# Patient Record
Sex: FEMALE | Race: ASIAN | NOT HISPANIC OR LATINO
[De-identification: names, ages, dates, MRNs, and addresses within clinical notes are randomized per-mention and may not be internally consistent; named-entity substitution may affect disease eponyms.]

---
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
## 2019-02-20 ENCOUNTER — APPOINTMENT (OUTPATIENT)
Dept: INTERNAL MEDICINE | Facility: CLINIC | Age: 19
End: 2019-02-20
Payer: COMMERCIAL

## 2019-02-20 VITALS
SYSTOLIC BLOOD PRESSURE: 123 MMHG | DIASTOLIC BLOOD PRESSURE: 76 MMHG | TEMPERATURE: 98.2 F | WEIGHT: 150 LBS | OXYGEN SATURATION: 98 % | HEIGHT: 67 IN | HEART RATE: 85 BPM | BODY MASS INDEX: 23.54 KG/M2

## 2019-02-20 PROCEDURE — 90471 IMMUNIZATION ADMIN: CPT

## 2019-02-20 PROCEDURE — 90715 TDAP VACCINE 7 YRS/> IM: CPT

## 2019-02-20 PROCEDURE — 99214 OFFICE O/P EST MOD 30 MIN: CPT | Mod: 25

## 2019-02-20 RX ORDER — ADAPALENE 3 MG/G
0.3 GEL TOPICAL DAILY
Qty: 1 | Refills: 2 | Status: DISCONTINUED | COMMUNITY
Start: 2018-11-15 | End: 2019-02-20

## 2019-02-20 NOTE — PAST MEDICAL HISTORY
[Menstruating] : menstruating [Menarche Age ____] : age at menarche was [unfilled] [Definite ___ (Date)] : the last menstrual period was [unfilled] [Normal Amount/Duration] : it was of a normal amount and duration [Normal Duration] : the duration was normal [Regular Cycle Intervals] : have been regular

## 2019-02-20 NOTE — HEALTH RISK ASSESSMENT
[No falls in past year] : Patient reported no falls in the past year [0] : 2) Feeling down, depressed, or hopeless: Not at all (0) [] : No [de-identified] : none [de-identified] : Ophthalmology [[id number]] : 0

## 2019-02-20 NOTE — HISTORY OF PRESENT ILLNESS
[FreeTextEntry1] : follow up visit for vitamin d deficiency and Acne [de-identified] : Patient is a 18/F presenting for a follow up examination. She was given adapalene for acne when she was seen last time however was not dispensed by pharmacy due to insurance issues, so she never got it. She was prescribed 50,000 U of Vitamin D, she has completed 8 weeks total. Apart from this, her overall health and mood is excellent.\par She was found to have elevated protein in her CMP. she takes about 1 serving of meat a day and denies taking any protein shakes or other supplements. denies any family history of leukemia, myeloma or other blood disorders.\par She was referred to see Dermatologist, she has an appointment coming up in March.

## 2019-02-20 NOTE — COUNSELING
[Weight management counseling provided] : Weight management [Healthy eating counseling provided] : healthy eating [Activity counseling provided] : activity [Behavioral health counseling provided] : behavioral health  [Weight Self Once Weekly] : Weight self once weekly [Low Fat Diet] : Low fat diet [___ min/wk activity recommended] : [unfilled] min/wk activity recommended [Walking] : Walking [Sleep ___ hours/day] : Sleep [unfilled] hours/day [Engage in a relaxing activity] : Engage in a relaxing activity [None] : None

## 2019-02-20 NOTE — END OF VISIT
[] : Resident [>50% of Time Spent on Counseling for ____] : Greater than 50% of the encounter time was spent on counseling for [unfilled]

## 2019-02-20 NOTE — PHYSICAL EXAM
[No Acute Distress] : no acute distress [Well Nourished] : well nourished [Well Developed] : well developed [Well-Appearing] : well-appearing [PERRL] : pupils equal round and reactive to light [EOMI] : extraocular movements intact [Normal Outer Ear/Nose] : the outer ears and nose were normal in appearance [Normal Oropharynx] : the oropharynx was normal [Normal Appearance] : was normal in appearance [Neck Supple] : was supple [Rate ___] : at [unfilled] breaths per minute [Normal Rhythm/Effort] : normal respiratory rhythm and effort [Clear Bilaterally] : the lungs were clear to auscultation bilaterally [Normal to Percussion] : the lungs were normal to percussion [5th Left ICS - MCL] : palpated at the 5th LICS in the midclavicular line [Normal Rate] : normal [Heart Rate ___] : [unfilled] bpm [Normal S1] : normal S1 [Normal S2] : normal S2 [No Murmur] : no murmurs heard [No Pitting Edema] : no pitting edema present [2+] : left 2+ [No Abnormalities] : the abdominal aorta was not enlarged and no bruit was heard [Soft, Nontender] : the abdomen was soft and nontender [No Mass] : no masses were palpated [No HSM] : no hepatosplenomegaly noted [Normal Kyphosis] : normal kyphosis [No Visual Abnormalities] : no visible abnormalities [Normal Lordosis] : normal lordosis [No Scoliosis] : no scoliosis [No Tenderness to Palpation] : no spine tenderness on palpation [No Masses] : no masses [Full ROM] : full ROM [No Pain with ROM] : no pain with motion in any direction [Intact] : all reflexes within normal limits bilaterally [Normal Station and Gait] : the gait and station were normal [Normal Motor Tone] : the muscle tone was normal [Involuntary Movements] : no involuntary movements were seen [Normal Scalp] : inspection of the scalp showed no abnormalities [Examination Of The Hair] : texture and distribution of hair was normal [Complexion Medium] : medium complexion [Normal] : the deep tendon reflexes were normal [Normal Mental Status] : the patient's orientation, memory, attention, language and fund of knowledge were normal [Appropriate] : appropriate [Enlarged Diffusely] : was not enlarged [S3] : no S3 [S4] : no S4 [Right Carotid Bruit] : no bruit heard over the right carotid [Left Carotid Bruit] : no bruit heard over the left carotid [Right Femoral Bruit] : no bruit heard over the right femoral artery [Left Femoral Bruit] : no bruit heard over the left femoral artery [Cervical Lymph Nodes Enlarged Posterior Bilaterally] : nodes not enlarged [Supraclavicular Lymph Nodes Enlarged Bilaterally] : nodes not enlarged [Axillary Lymph Nodes Enlarged Bilaterally] : nodes not enlarged [Inguinal Lymph Nodes Enlarged Bilaterally] : nodes not enlarged [Abnormal Color] : normal color and pigmentation [Skin Lesions 1] : no skin lesions were observed [Skin Turgor Decreased] : normal skin turgor [Impaired judgment] : intact judgment [Impaired Insight] : intact insight [de-identified] : wearing contact lenses

## 2019-02-20 NOTE — ASSESSMENT
[FreeTextEntry1] : Tdap vaccination: MEDICATION NAME: Boostrix 5-2.5-18.5 SUSP\par \par GENERIC NAME:  Diphtheria and Tetanus Toxoids, and Acellular Pertussis Vaccine (dif THEER ee a & TET a nus TOKS nalinis, & ay SHAJI marga lar per TUS sis vak SEEN) \par \par COMMON USES:  It is used to prevent diphtheria, tetanus, and pertussis. \par \par HOW TO USE THIS MEDICINE:  HOW IS THIS DRUG BEST TAKEN? Use this drug as ordered by your doctor. Read all information given to you. Follow all instructions closely. It is given as a shot into a muscle. HOW DO I STORE AND/OR THROW OUT THIS DRUG? If you need to store this drug at home, talk with your doctor, nurse, or pharmacist about how to store it. WHAT DO I DO IF I MISS A DOSE? Call your doctor to find out what to do. \par \par CAUTIONS:  Tell all of your health care providers that you take this drug. This includes your doctors, nurses, pharmacists, and dentists. This drug may not protect all people who use it. Talk with the doctor. If you have a latex allergy, talk with your doctor. Not all brands of vaccines are for children. Talk with your child's doctor. Some children may need to have more than 1 dose of this vaccine. Talk with your child's doctor. Tell your doctor if you are pregnant or plan on getting pregnant. You will need to talk about the benefits and risks of using this drug while you are pregnant. Tell your doctor if you are breast-feeding. You will need to talk about any risks to your baby. \par \par POSSIBLE SIDE EFFECTS:  WHAT ARE SOME SIDE EFFECTS THAT I NEED TO CALL MY DOCTOR ABOUT RIGHT AWAY? WARNING/CAUTION: Even though it may be rare, some people may have very bad and sometimes deadly side effects when taking a drug. Tell your doctor or get medical help right away if you have any of the following signs or symptoms that may be related to a very bad side effect: Signs of an allergic reaction, like rash; hives; itching; red, swollen, blistered, or peeling skin with or without fever; wheezing; tightness in the chest or throat; trouble breathing, swallowing, or talking; unusual hoarseness; or swelling of the mouth, face, lips, tongue, or throat. Feeling confused. Very bad dizziness or passing out. Change in eyesight. Seizures. A burning, numbness, or tingling feeling that is not normal. Muscle weakness. Trouble controlling body movements. Very bad irritation where the shot was given. WHAT ARE SOME OTHER SIDE EFFECTS OF THIS DRUG? All drugs may cause side effects. However, many people have no side effects or only have minor side effects. Call your doctor or get medical help if any of these side effects or any other side effects bother you or do not go away: Pain where the shot was given. Redness or swelling where the shot is given. Headache. Feeling tired or weak. Fever or chills. Upset stomach or throwing up. Belly pain. Diarrhea. Joint pain or swelling. Swollen gland. Young children: Feeling fussy. Not hungry. Feeling sleepy. Crying that is not normal. These are not all of the side effects that may occur. If you have questions about side effects, call your doctor. Call your doctor for medical advice about side effects. You may report side effects to the FDA at 9-083-FDA-9305. You may also report side effects at http://www.fda.gov/medwatch. http://www.fda.gov/medwatch. \par \par BEFORE USING THIS MEDICINE:  WHAT DO I NEED TO TELL MY DOCTOR BEFORE I TAKE THIS DRUG? TELL YOUR DOCTOR: If you have an allergy to any part of this drug. TELL YOUR DOCTOR: If you are allergic to any drugs like this one, any other drugs, foods, or other substances. Tell your doctor about the allergy and what signs you had, like rash; hives; itching; shortness of breath; wheezing; cough; swelling of face, lips, tongue, or throat; or any other signs. TELL YOUR DOCTOR: If you have seizures or any other brain or nervous system problem. TELL YOUR DOCTOR: If you have had a brain problem like coma, lowered level of awareness, or seizures from an unknown cause within 7 days of a previous vaccine that has pertussis. This is not a list of all drugs or health problems that interact with this drug. Tell your doctor and pharmacist about all of your drugs (prescription or OTC, natural products, vitamins) and health problems. You must check to make sure that it is safe for you to take this drug with all of your drugs and health problems. Do not start, stop, or change the dose of any drug without checking with your doctor. \par \par OVERDOSE:  If you think there has been an overdose, call your poison control center or get medical care right away. Be ready to tell or show what was taken, how much, and when it happened. emergency room (ER) right away. \par \par ADDITIONAL INFORMATION:  If your symptoms or health problems do not get better or if they become worse, call your doctor. Do not share your drugs with others and do not take anyone else's drugs. Keep a list of all your drugs (prescription, natural products, vitamins, OTC) with you. Give this list to your doctor. Talk with the doctor before starting any new drug, including prescription or OTC, natural products, or vitamins. Keep all drugs in a safe place. Keep all drugs out of the reach of children and pets. Throw away unused or  drugs. Do not flush down a toilet or pour down a drain unless you are told to do so. Check with your pharmacist if you have questions about the best way to throw out drugs. There may be drug take-back programs in your area. Some drugs may have another patient information leaflet. Check with your pharmacist. If you have any questions about this drug, please talk with your doctor, nurse, pharmacist, or other health care provider. \par \par Copyright 2019 [employer]. All rights reserved.   \par \par Vitamin D deficiency: Completed 8 weeks of 50,000 U of vitamin d, will recheck levels today. Advised to take over the counter 1000U of vitamin D in the meanwhile and encourage sun exposure\par \par Elevated Serum protein: Will recheck CMP to see if she still has elevated protein. check SPEP and serum total protein. this could be related to dietary cause , malignancies but unlikely, infections , myelomas, but again unlikely , amyloid sarcoid among other etiological causes . I will repeat first and determine if it is a true elevation.  \par \par Acne: she will see Dermatologist to discuss further plan of care      \par \par Health maintenance: TDap vaccine given today as her previous one was in  \par \par

## 2019-02-22 LAB
25(OH)D3 SERPL-MCNC: 32.1 NG/ML
ALBUMIN SERPL ELPH-MCNC: 4.5 G/DL
ALP BLD-CCNC: 71 U/L
ALT SERPL-CCNC: 13 U/L
ANION GAP SERPL CALC-SCNC: 13 MMOL/L
AST SERPL-CCNC: 20 U/L
BILIRUB SERPL-MCNC: 0.7 MG/DL
BUN SERPL-MCNC: 11 MG/DL
CALCIUM SERPL-MCNC: 9.8 MG/DL
CHLORIDE SERPL-SCNC: 101 MMOL/L
CO2 SERPL-SCNC: 25 MMOL/L
CREAT SERPL-MCNC: 0.7 MG/DL
GLUCOSE SERPL-MCNC: 83 MG/DL
POTASSIUM SERPL-SCNC: 4.2 MMOL/L
PROT SERPL-MCNC: 8.5 G/DL
PROT SERPL-MCNC: 8.8 G/DL
SODIUM SERPL-SCNC: 139 MMOL/L

## 2019-03-07 ENCOUNTER — TRANSCRIPTION ENCOUNTER (OUTPATIENT)
Age: 19
End: 2019-03-07

## 2019-03-26 ENCOUNTER — APPOINTMENT (OUTPATIENT)
Dept: DERMATOLOGY | Facility: CLINIC | Age: 19
End: 2019-03-26
Payer: COMMERCIAL

## 2019-03-26 VITALS
WEIGHT: 146 LBS | HEART RATE: 73 BPM | SYSTOLIC BLOOD PRESSURE: 106 MMHG | TEMPERATURE: 99.3 F | DIASTOLIC BLOOD PRESSURE: 72 MMHG | BODY MASS INDEX: 22.91 KG/M2 | HEIGHT: 67 IN

## 2019-03-26 PROCEDURE — 99203 OFFICE O/P NEW LOW 30 MIN: CPT

## 2019-05-08 ENCOUNTER — APPOINTMENT (OUTPATIENT)
Dept: INTERNAL MEDICINE | Facility: CLINIC | Age: 19
End: 2019-05-08

## 2019-05-28 ENCOUNTER — APPOINTMENT (OUTPATIENT)
Dept: DERMATOLOGY | Facility: CLINIC | Age: 19
End: 2019-05-28

## 2019-06-12 ENCOUNTER — APPOINTMENT (OUTPATIENT)
Dept: INTERNAL MEDICINE | Facility: CLINIC | Age: 19
End: 2019-06-12
Payer: COMMERCIAL

## 2019-06-12 VITALS
SYSTOLIC BLOOD PRESSURE: 110 MMHG | WEIGHT: 143 LBS | BODY MASS INDEX: 22.44 KG/M2 | TEMPERATURE: 98.1 F | DIASTOLIC BLOOD PRESSURE: 60 MMHG | HEIGHT: 67 IN | OXYGEN SATURATION: 98 % | HEART RATE: 78 BPM

## 2019-06-12 PROCEDURE — 99214 OFFICE O/P EST MOD 30 MIN: CPT

## 2019-06-12 NOTE — PHYSICAL EXAM
[Well Nourished] : well nourished [No Acute Distress] : no acute distress [Well-Appearing] : well-appearing [Well Developed] : well developed [PERRL] : pupils equal round and reactive to light [Normal Sclera/Conjunctiva] : normal sclera/conjunctiva [Normal Oropharynx] : the oropharynx was normal [Normal Outer Ear/Nose] : the outer ears and nose were normal in appearance [EOMI] : extraocular movements intact [No JVD] : no jugular venous distention [Supple] : supple [No Lymphadenopathy] : no lymphadenopathy [No Respiratory Distress] : no respiratory distress  [No Accessory Muscle Use] : no accessory muscle use [Normal Rate] : normal rate  [Clear to Auscultation] : lungs were clear to auscultation bilaterally [Regular Rhythm] : with a regular rhythm [Normal S1, S2] : normal S1 and S2 [No Edema] : there was no peripheral edema [No Extremity Clubbing/Cyanosis] : no extremity clubbing/cyanosis [No Palpable Aorta] : no palpable aorta [Soft] : abdomen soft [Non-distended] : non-distended [Non Tender] : non-tender [No Masses] : no abdominal mass palpated [Normal Posterior Cervical Nodes] : no posterior cervical lymphadenopathy [No HSM] : no HSM [Normal Bowel Sounds] : normal bowel sounds [Normal Anterior Cervical Nodes] : no anterior cervical lymphadenopathy [No CVA Tenderness] : no CVA  tenderness [No Joint Swelling] : no joint swelling [Grossly Normal Strength/Tone] : grossly normal strength/tone [No Spinal Tenderness] : no spinal tenderness [Coordination Grossly Intact] : coordination grossly intact [Normal Gait] : normal gait [No Rash] : no rash [No Focal Deficits] : no focal deficits [Deep Tendon Reflexes (DTR)] : deep tendon reflexes were 2+ and symmetric [Normal Affect] : the affect was normal [Normal Insight/Judgement] : insight and judgment were intact

## 2019-06-12 NOTE — COUNSELING
[Healthy eating counseling provided] : healthy eating [Plan in advance] : Plan in advance [Engage in a relaxing activity] : Engage in a relaxing activity [Sleep ___ hours/day] : Sleep [unfilled] hours/day [Good understanding] : Patient has a good understanding of lifestyle changes and the steps needed to achieve self management goals

## 2019-06-12 NOTE — END OF VISIT
[FreeTextEntry3] : discussed with resident  plan exam recommendations.   [>50% of Time Spent on Counseling for ____] : Greater than 50% of the encounter time was spent on counseling for [unfilled]

## 2019-06-12 NOTE — HISTORY OF PRESENT ILLNESS
[FreeTextEntry1] : elevated protein. [de-identified] : PT has elevated protein of 8.8 and she is not drinking protein drinks and doesn’t follow keto diet or high meat or protein consumption  she has seen dermatologist and I appreciate her note.  she is feeling good and has no complaints.

## 2019-06-12 NOTE — HEALTH RISK ASSESSMENT
[0] : 2) Feeling down, depressed, or hopeless: Not at all (0) [de-identified] : twice a week.   [] : No [de-identified] : regular.

## 2019-06-12 NOTE — ASSESSMENT
[FreeTextEntry1] : elevated protein She will have repeated levels taken today and we discussed causes of elevated protein and will refer to hematologist if still elevated.   amyloid cancers, inflammation  diet.  She will have further testing today as well. She is not dehydrated and drinks 88 ounces a day and has normal urine color.  Multiple myeloma can also cause  elevated protein but she doesn’t fit the  profile of pts with this disorder.   She will keep diary of food intake and protein intake.

## 2019-06-14 LAB
ALBUMIN MFR SERPL ELPH: 53.4 %
ALBUMIN MFR SERPL ELPH: 54.6 %
ALBUMIN SERPL-MCNC: 4.3 G/DL
ALBUMIN SERPL-MCNC: 4.3 G/DL
ALBUMIN/GLOB SERPL: 1.2 RATIO
ALBUMIN/GLOB SERPL: 1.2 RATIO
ALPHA1 GLOB MFR SERPL ELPH: 3.5 %
ALPHA1 GLOB MFR SERPL ELPH: 3.6 %
ALPHA1 GLOB SERPL ELPH-MCNC: 0.3 G/DL
ALPHA1 GLOB SERPL ELPH-MCNC: 0.3 G/DL
ALPHA2 GLOB MFR SERPL ELPH: 8.3 %
ALPHA2 GLOB MFR SERPL ELPH: 8.6 %
ALPHA2 GLOB SERPL ELPH-MCNC: 0.7 G/DL
ALPHA2 GLOB SERPL ELPH-MCNC: 0.7 G/DL
ANION GAP SERPL CALC-SCNC: 12 MMOL/L
APO A-I SERPL-MCNC: 139 MG/DL
APO A-I/APO B SERPL: 0.6 RATIO
APO B SERPL-MCNC: 85 MG/DL
B-GLOBULIN MFR SERPL ELPH: 10 %
B-GLOBULIN MFR SERPL ELPH: 9.9 %
B-GLOBULIN SERPL ELPH-MCNC: 0.8 G/DL
B-GLOBULIN SERPL ELPH-MCNC: 0.8 G/DL
BASOPHILS # BLD AUTO: 0.04 K/UL
BASOPHILS NFR BLD AUTO: 0.5 %
BUN SERPL-MCNC: 10 MG/DL
CALCIUM SERPL-MCNC: 10.2 MG/DL
CHLORIDE SERPL-SCNC: 99 MMOL/L
CO2 SERPL-SCNC: 26 MMOL/L
CREAT SERPL-MCNC: 0.65 MG/DL
CRP SERPL-MCNC: 0.16 MG/DL
DEPRECATED KAPPA LC FREE/LAMBDA SER: 1.16 RATIO
EOSINOPHIL # BLD AUTO: 0.16 K/UL
EOSINOPHIL NFR BLD AUTO: 1.9 %
ERYTHROCYTE [SEDIMENTATION RATE] IN BLOOD BY WESTERGREN METHOD: 43 MM/HR
FERRITIN SERPL-MCNC: 25 NG/ML
GAMMA GLOB FLD ELPH-MCNC: 1.9 G/DL
GAMMA GLOB FLD ELPH-MCNC: 2 G/DL
GAMMA GLOB MFR SERPL ELPH: 23.5 %
GAMMA GLOB MFR SERPL ELPH: 24.6 %
GLUCOSE SERPL-MCNC: 83 MG/DL
HCT VFR BLD CALC: 36.5 %
HCV AB SER QL: NONREACTIVE
HCV S/CO RATIO: 0.13 S/CO
HEPATITIS A IGG ANTIBODY: REACTIVE
HGB BLD-MCNC: 11.3 G/DL
IGA SER QL IEP: 222 MG/DL
IGG SER QL IEP: 2031 MG/DL
IGM SER QL IEP: 218 MG/DL
IMM GRANULOCYTES NFR BLD AUTO: 0.4 %
INTERPRETATION SERPL IEP-IMP: NORMAL
INTERPRETATION SERPL IEP-IMP: NORMAL
IRON SATN MFR SERPL: 19 %
IRON SERPL-MCNC: 58 UG/DL
KAPPA LC CSF-MCNC: 1.58 MG/DL
KAPPA LC SERPL-MCNC: 1.83 MG/DL
LYMPHOCYTES # BLD AUTO: 2.26 K/UL
LYMPHOCYTES NFR BLD AUTO: 26.9 %
M PROTEIN SPEC IFE-MCNC: NORMAL
MAN DIFF?: NORMAL
MCHC RBC-ENTMCNC: 26 PG
MCHC RBC-ENTMCNC: 31 GM/DL
MCV RBC AUTO: 84.1 FL
MONOCYTES # BLD AUTO: 0.66 K/UL
MONOCYTES NFR BLD AUTO: 7.9 %
NEUTROPHILS # BLD AUTO: 5.25 K/UL
NEUTROPHILS NFR BLD AUTO: 62.4 %
PLATELET # BLD AUTO: 174 K/UL
POTASSIUM SERPL-SCNC: 4.1 MMOL/L
PROT SERPL-MCNC: 7.9 G/DL
PROT SERPL-MCNC: 7.9 G/DL
PROT SERPL-MCNC: 8 G/DL
PROT SERPL-MCNC: 8 G/DL
PROT UR-MCNC: 5 MG/DL
RBC # BLD: 4.34 M/UL
RBC # FLD: 14.1 %
SODIUM SERPL-SCNC: 137 MMOL/L
TIBC SERPL-MCNC: 304 UG/DL
UIBC SERPL-MCNC: 246 UG/DL
WBC # FLD AUTO: 8.4 K/UL

## 2019-07-02 ENCOUNTER — APPOINTMENT (OUTPATIENT)
Dept: DERMATOLOGY | Facility: CLINIC | Age: 19
End: 2019-07-02
Payer: COMMERCIAL

## 2019-07-02 VITALS
BODY MASS INDEX: 22.44 KG/M2 | TEMPERATURE: 98 F | HEIGHT: 67 IN | WEIGHT: 143 LBS | DIASTOLIC BLOOD PRESSURE: 72 MMHG | HEART RATE: 75 BPM | SYSTOLIC BLOOD PRESSURE: 115 MMHG

## 2019-07-02 PROCEDURE — 99213 OFFICE O/P EST LOW 20 MIN: CPT

## 2019-09-24 ENCOUNTER — APPOINTMENT (OUTPATIENT)
Dept: DERMATOLOGY | Facility: CLINIC | Age: 19
End: 2019-09-24
Payer: COMMERCIAL

## 2019-10-04 ENCOUNTER — APPOINTMENT (OUTPATIENT)
Dept: DERMATOLOGY | Facility: CLINIC | Age: 19
End: 2019-10-04
Payer: COMMERCIAL

## 2019-10-04 VITALS — HEIGHT: 68 IN | WEIGHT: 143 LBS | BODY MASS INDEX: 21.67 KG/M2

## 2019-10-04 PROCEDURE — 99213 OFFICE O/P EST LOW 20 MIN: CPT | Mod: GC

## 2019-10-23 ENCOUNTER — APPOINTMENT (OUTPATIENT)
Dept: INTERNAL MEDICINE | Facility: CLINIC | Age: 19
End: 2019-10-23

## 2020-08-27 ENCOUNTER — APPOINTMENT (OUTPATIENT)
Dept: INTERNAL MEDICINE | Facility: CLINIC | Age: 20
End: 2020-08-27
Payer: COMMERCIAL

## 2020-08-27 VITALS
BODY MASS INDEX: 21.37 KG/M2 | WEIGHT: 141 LBS | SYSTOLIC BLOOD PRESSURE: 133 MMHG | DIASTOLIC BLOOD PRESSURE: 74 MMHG | OXYGEN SATURATION: 98 % | HEART RATE: 111 BPM | HEIGHT: 68 IN | TEMPERATURE: 97.6 F

## 2020-08-27 PROCEDURE — 99395 PREV VISIT EST AGE 18-39: CPT

## 2020-08-27 NOTE — END OF VISIT
[>50% of the face to face encounter time was spent on counseling and/or coordination of care for ___] : Greater than 50% of the face to face encounter time was spent on counseling and/or coordination of care for [unfilled] [FreeTextEntry3] : resident was present for exam

## 2020-08-27 NOTE — ASSESSMENT
[FreeTextEntry1] : health she has normal bmi and is up to date with vaccines .  she needs flu vaccine \par bmi 20 increase exercise to 150-300mins \par 2 anemia  recheck her iron cbc \par 3. elevated blood protein  recheck her level \par 4 acne vulgaris- refer to  dermatologist for fu.  Although acne is not physically disabling, its psychologic impact can be striking, contributing to low self-esteem, depression, and anxiety [1-3]. As a result, there is a significant demand for effective acne therapies (table 1).\par \par The lack of standardization for grading acne severity and measuring treatment outcomes has made systematic interpretation of the literature difficult [4]. However, quality evidence-based literature in the field of acne is increasing [5-7].\par \par Medical therapies for acne and therapeutic principles that support the selection of treatments will be discussed here. A table summarizing treatment recommendations for patients with acne vulgaris is provided (table 2). Procedural therapies for acne, oral isotretinoin therapy for severe acne, hormonal therapy for women with acne, and interventions for acne scars are reviewed in detail separately. (See "Light-based, adjunctive, and other therapies for acne vulgaris" and "Oral isotretinoin therapy for acne vulgaris" and "Hormonal therapy for women with acne vulgaris" and "Management of acne scars".)\par \par \par PRETREATMENT ASSESSMENT\par Deciding on the appropriate course of treatment for acne requires a comprehensive assessment that includes:\par \par ?Clinical type and severity of acne (eg, comedonal, papulopustular, mixed, nodular)\par \par \par •Determines the types of treatments needed (see 'General approach' below)\par \par \par ?Skin type (eg, dry, oily)\par \par \par •Influences choice of topical drug vehicle (see 'General approach' below)\par \par \par ?Presence of acne scarring\par \par \par •Indicates need to consider more aggressive acne therapy and treatments for scarring (see 'Oral isotretinoin' below and "Management of acne scars")\par \par \par ?Presence of postinflammatory erythema or postinflammatory hyperpigmentation\par \par \par •Indicates need to consider therapies for hyperpigmentation or erythema as well as the need to resolve and prevent inflammatory acne lesions (see 'Therapy for postinflammatory hyperpigmentation' below)\par \par \par ?Menstrual cycle history and history of signs of hyperandrogenism in women\par \par \par •Identifies need to consider laboratory workup and hormonal therapies (see "Hormonal therapy for women with acne vulgaris" and "Pathogenesis, clinical manifestations, and diagnosis of acne vulgaris", section on 'Additional tests')\par \par \par ?Current skin care regimen and acne treatment history\par \par \par •Identifies successful and unsuccessful previous treatments\par \par \par •Identifies skin care practices that should be adjusted or discontinued during acne therapy (see 'Home skin care recommendations' below)\par \par \par ?History of acne-promoting cosmetic products and medications\par \par \par •Identifies potential for improvement with discontinuation of topical cosmetic products (acne cosmetica) and medications (acne medicamentosa) that may contribute to acne (table 3) \par \par \par ?Psychologic impact of acne on the patient\par \par \par •Identifies need for a more aggressive treatment approach or psychologic services\par \par \par \par TREATMENT PRINCIPLES\par Medical therapies for acne target one or more of four key factors that promote the development of acne lesions: follicular hyperproliferation and abnormal desquamation, increased sebum production, Cutibacterium (formerly Propionibacterium) acnes proliferation, and inflammation (figure 1). (See "Pathogenesis, clinical manifestations, and diagnosis of acne vulgaris", section on 'Pathogenesis'.)\par \par These factors are targeted as follows [8]:\par \par ?Follicular hyperproliferation and abnormal desquamation\par \par \par •Topical retinoids\par \par •Oral retinoids\par \par •Azelaic acid\par \par •Salicylic acid\par \par •Hormonal therapies\par \par \par ?Increased sebum production\par \par \par •Oral isotretinoin\par \par •Hormonal therapies\par \par \par ?C. acnes proliferation \par \par \par •Benzoyl peroxide\par \par •Topical and oral antibiotics\par \par •Azelaic acid\par \par \par ?Inflammation\par \par \par •Oral isotretinoin\par \par •Oral tetracyclines\par \par •Topical retinoids\par \par •Azelaic acid\par \par \par In addition, the procedural therapies used as adjunctive therapies for acne target one or more contributory factors. The mechanisms of these interventions are reviewed separately. (See "Light-based, adjunctive, and other therapies for acne vulgaris".)\par \par Knowledge of mechanisms of action of acne therapies is combined with recognition of specific clinical features to determine the best approach to treatment. The following concepts guide the selection of therapy:\par \par ?Topical retinoids are beneficial for both comedonal (noninflammatory) (picture 1A-B) and inflammatory acne (picture 2A) and should be included in the initial management of most patients. Topical retinoids are effective in the treatment of comedonal acne due to their ability to normalize follicular hyperkeratosis and prevent formation of the microcomedo, the primary lesion of acne [9]. The efficacy of topical retinoids for inflammatory acne [10,11] may be due to a combination of intrinsic anti-inflammatory properties of topical retinoids and their ability to prevent the formation of microcomedones. Topical retinoids can be used as monotherapy in individuals with exclusively comedonal acne.\par \par \par ?Patients with an inflammatory component often benefit from antimicrobial therapies (eg, benzoyl peroxide or topical antibiotics). Antimicrobial agents reduce the number of proinflammatory C. acnes colonizing the skin. (See 'Topical antimicrobials' below.)\par \par \par ?Patients with moderate to severe inflammatory acne often warrant more aggressive treatment with oral antibiotics [8]. Antibiotics in the tetracycline class are most frequently used, and appear to have both antibacterial and anti-inflammatory properties. (See 'Oral antibiotics' below.)\par \par \par ?The use of benzoyl peroxide with topical or oral antibiotics decreases the emergence of antibiotic resistant bacteria. Therefore, use of benzoyl peroxide is recommended in patients receiving antibiotic therapy [7]. (See 'Oral antibiotics' below.)\par \par \par ?Androgens stimulate increased sebum production, which contributes to the formation of acne [9]. Hormonal therapy may benefit women with moderate to severe acne, even in the absence of a hyperandrogenic state. (See "Hormonal therapy for women with acne vulgaris".)\par \par \par ?Patients should be given realistic expectations regarding timelines for improvement. Improvement in acne is dependent upon both the prevention and resolution of acne papules, pustules, and nodules. At least two to three months of consistent adherence to a therapeutic regimen is often necessary prior to concluding that treatment is ineffective. Adjustments to the regimen also may be needed. \par \par \par ?Acne typically recurs over years, and maintenance therapy is an important component of acne management. (See 'Maintenance therapy' below.)\par \par \par \par APPROACH TO TREATMENT\par \par General approach — An example of an initial approach to acne based upon the principles above is outlined below. Additional therapeutic options are reviewed in a table derived from the 2016 acne treatment guidelines from the American Academy [employer] Dermat[employer] (table 2) [7]. \par \par ?Comedonal (noninflammatory) acne (picture 1A-B)\par \par \par •Topical retinoid (alternatives include azelaic acid and salicylic acid) \par \par \par ?Mild papulopustular and mixed (comedonal and papulopustular) acne (picture 2A-B)\par \par \par •Topical antimicrobial (eg, benzoyl peroxide alone or benzoyl peroxide +/- topical antibiotic) AND\par \par •Topical retinoid \par \par \par OR\par \par \par •Benzoyl peroxide AND topical antibiotic (for patients who cannot tolerate a retinoid or who require a simplified treatment regimen)\par \par \par ?Moderate papulopustular and mixed acne (picture 3)\par \par \par •Topical retinoid AND\par \par •Oral antibiotic AND\par \par •Topical benzoyl peroxide \par \par \par ?Severe acne (eg, nodular acne) (picture 4A-C)\par \par \par •Topical retinoid AND\par \par •Oral antibiotic AND\par \par •Topical benzoyl peroxide\par \par \par OR\par \par \par •Oral isotretinoin monotherapy (see 'Oral isotretinoin' below and "Oral isotretinoin therapy for acne vulgaris")\par \par \par Of note, oral hormonal therapy is an alternative to oral antibiotic therapy in postmenarchal females with moderate to severe acne (table 2). (See "Hormonal therapy for women with acne vulgaris".)\par \par Consistent adherence to acne therapy is critical for achieving clinical improvement. When recommending topical therapy, the clinician should design a regimen that is feasible for the patient. This should involve a discussion with the patient to ensure that each component of the treatment regimen is acceptable to the patient. We typically design regimens that require the patient to treat no more than once or twice daily. \par \par A sample regimen for a patient with mild inflammatory facial acne who is using a topical retinoid, topical benzoyl peroxide, and topical clindamycin is as follows:\par \par ?Morning: Wash face with a gentle facial cleanser. Apply a thin layer of a fixed-dose combination benzoyl peroxide/clindamycin gel to the entire face. (An alternative regimen could require the patient to wash the face with a benzoyl peroxide cleanser followed by application of a thin layer of topical clindamycin to the entire face.)\par \par \par ?Night: Wash face with a gentle facial cleanser. Apply a thin layer of the topical retinoid to the entire face.\par \par \par Some patients may prefer the use of fixed-dose combination products to simplify treatment (table 4). However, the cost of such treatments may be higher than single-active ingredient agents. (See 'Combination therapy' below.)\par \par The selection of the delivery system for topical acne medications also may help to improve the likelihood of adherence to treatment. The choice depends upon the patient's skin type (dry versus oily) and preference. Some gels have a drying effect; they may be preferred by patients with oily skin. Creams and lotions tend to be moisturizing. Solutions are drying but they cover large areas more easily than other preparations, and foams are easy to apply to hair-bearing areas. Pledgets are single-use absorbent pads impregnated with medication. They are convenient to use and facilitate the spreading of medication over large areas. \par \par Clinicians should be aware that further modifications to the vehicle of a drug may also affect characteristics such as efficacy, tolerability, or stability. As an example, the microsphere formulation of tretinoin consists of tretinoin contained in microscopic biodegradable spherical particles. As the microspheres are degraded, tretinoin is slowly and progressively delivered to the skin. This formulation is associated with improved drug stability and decreased irritation [12,13]. (See 'Topical retinoids' below.)\par \par Procedural therapies are sometimes used in conjunction with conventional medical therapy. These interventions are reviewed separately. (See "Light-based, adjunctive, and other therapies for acne vulgaris", section on 'Light/laser therapies' and "Light-based, adjunctive, and other therapies for acne vulgaris", section on 'Adjunctive therapies'.)\par \par Truncal acne — Although the treatment of both facial and truncal acne can be approached similarly, a challenge for the treatment of truncal acne is the difficulty associated with applying topical treatments to the entire affected area. Given the difficulty that patients may have applying medications to areas such as the back compared with the face and the large quantity of topical medication required for use on large areas, we have a lower threshold for incorporating oral antibiotic or oral hormonal therapies into the treatment regimen. Pharmacy-provided medication applicators designed to aid with application of medication or emollients to the back are sometimes helpful for topical treatment\par

## 2020-08-27 NOTE — COUNSELING
[Sleep ___ hours/day] : Sleep [unfilled] hours/day [Plan in advance] : Plan in advance [Engage in a relaxing activity] : Engage in a relaxing activity [____ min/wk Activity] : [unfilled] min/wk activity

## 2020-08-27 NOTE — HISTORY OF PRESENT ILLNESS
[FreeTextEntry1] : cpe  [de-identified] : Pt is a 20 yr old woman who has hx of anemia dn acne vulgaris who came for her cpe.

## 2020-08-27 NOTE — HEALTH RISK ASSESSMENT
[Very Good] : ~his/her~ current health as very good [Excellent] : ~his/her~  mood as  excellent [No] : No [No falls in past year] : Patient reported no falls in the past year [0] : 2) Feeling down, depressed, or hopeless: Not at all (0) [Hepatitis C test offered] : Hepatitis C test offered [None] : None [# of Members in Household ___] :  household currently consist of [unfilled] member(s) [With Family] : lives with family [Student] : student [College] : College [Single] : single [Smoke Detector] : smoke detector [Carbon Monoxide Detector] : carbon monoxide detector [Safety elements used in home] : safety elements used in home [Sunscreen] : uses sunscreen [Seat Belt] :  uses seat belt [FreeTextEntry1] : facial sunburn [] : No [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  [de-identified] : walks  [de-identified] : healthy  [Change in mental status noted] : No change in mental status noted [[id number]] : 0 [Language] : denies difficulty with language [Behavior] : denies difficulty with behavior [Learning/Retaining New Information] : denies difficulty learning/retaining new information [Handling Complex Tasks] : denies difficulty handling complex tasks [Reasoning] : denies difficulty with reasoning [Spatial Ability and Orientation] : denies difficulty with spatial ability and orientation [Sexually Active] : not sexually active [Reports changes in hearing] : Reports no changes in hearing [Reports changes in dental health] : Reports no changes in dental health [Reports changes in vision] : Reports no changes in vision [Guns at Home] : no guns at home [Travel to Developing Areas] : does not  travel to developing areas [TB Exposure] : is not being exposed to tuberculosis [Caregiver Concerns] : does not have caregiver concerns [PapSmearDate] : never  [HepatitisCDate] : 6/19 [de-identified] : last eye  exam 2/20 [AdvancecareDate] : 08/20

## 2020-08-27 NOTE — PHYSICAL EXAM
[Normal] : no joint swelling, grossly normal strength/tone [de-identified] : no lymphadenopathy bilateral  [de-identified] : examined sitting and supine  normal exam  [de-identified] : Fundoscopic normal  [de-identified] : good skin turgor  no eruptions   [de-identified] : alert and oriented normal affect normal exam

## 2020-08-28 LAB
25(OH)D3 SERPL-MCNC: 22.7 NG/ML
ALBUMIN SERPL ELPH-MCNC: 4.8 G/DL
ALP BLD-CCNC: 60 U/L
ALT SERPL-CCNC: 9 U/L
ANION GAP SERPL CALC-SCNC: 13 MMOL/L
AST SERPL-CCNC: 16 U/L
BASOPHILS # BLD AUTO: 0.07 K/UL
BASOPHILS NFR BLD AUTO: 0.7 %
BILIRUB SERPL-MCNC: 0.6 MG/DL
BUN SERPL-MCNC: 8 MG/DL
CALCIUM SERPL-MCNC: 9.6 MG/DL
CHLORIDE SERPL-SCNC: 103 MMOL/L
CHOLEST SERPL-MCNC: 187 MG/DL
CHOLEST/HDLC SERPL: 3.7 RATIO
CO2 SERPL-SCNC: 23 MMOL/L
CREAT SERPL-MCNC: 0.63 MG/DL
EOSINOPHIL # BLD AUTO: 0.18 K/UL
EOSINOPHIL NFR BLD AUTO: 1.7 %
FERRITIN SERPL-MCNC: 40 NG/ML
GLUCOSE SERPL-MCNC: 78 MG/DL
HCT VFR BLD CALC: 39.3 %
HDLC SERPL-MCNC: 50 MG/DL
HGB BLD-MCNC: 11.7 G/DL
IMM GRANULOCYTES NFR BLD AUTO: 0.3 %
IRON SATN MFR SERPL: 19 %
IRON SERPL-MCNC: 59 UG/DL
LDLC SERPL CALC-MCNC: 123 MG/DL
LYMPHOCYTES # BLD AUTO: 2.06 K/UL
LYMPHOCYTES NFR BLD AUTO: 19.8 %
MAN DIFF?: NORMAL
MCHC RBC-ENTMCNC: 26.9 PG
MCHC RBC-ENTMCNC: 29.8 GM/DL
MCV RBC AUTO: 90.3 FL
MONOCYTES # BLD AUTO: 0.69 K/UL
MONOCYTES NFR BLD AUTO: 6.6 %
NEUTROPHILS # BLD AUTO: 7.35 K/UL
NEUTROPHILS NFR BLD AUTO: 70.9 %
PLATELET # BLD AUTO: 193 K/UL
POTASSIUM SERPL-SCNC: 4.1 MMOL/L
PROT SERPL-MCNC: 8.3 G/DL
RBC # BLD: 4.35 M/UL
RBC # FLD: 13.3 %
SODIUM SERPL-SCNC: 139 MMOL/L
TIBC SERPL-MCNC: 309 UG/DL
TRIGL SERPL-MCNC: 71 MG/DL
UIBC SERPL-MCNC: 250 UG/DL
WBC # FLD AUTO: 10.38 K/UL

## 2020-08-31 LAB
M TB IFN-G BLD-IMP: NEGATIVE
QUANTIFERON TB PLUS MITOGEN MINUS NIL: 7.2 IU/ML
QUANTIFERON TB PLUS NIL: 0.01 IU/ML
QUANTIFERON TB PLUS TB1 MINUS NIL: 0 IU/ML
QUANTIFERON TB PLUS TB2 MINUS NIL: 0 IU/ML

## 2020-10-26 ENCOUNTER — APPOINTMENT (OUTPATIENT)
Dept: DERMATOLOGY | Facility: CLINIC | Age: 20
End: 2020-10-26
Payer: COMMERCIAL

## 2020-10-26 PROCEDURE — 99213 OFFICE O/P EST LOW 20 MIN: CPT

## 2020-10-26 PROCEDURE — 99072 ADDL SUPL MATRL&STAF TM PHE: CPT

## 2020-11-02 ENCOUNTER — NON-APPOINTMENT (OUTPATIENT)
Age: 20
End: 2020-11-02

## 2020-11-02 ENCOUNTER — TRANSCRIPTION ENCOUNTER (OUTPATIENT)
Age: 20
End: 2020-11-02

## 2021-01-30 ENCOUNTER — NON-APPOINTMENT (OUTPATIENT)
Age: 21
End: 2021-01-30

## 2021-02-22 ENCOUNTER — APPOINTMENT (OUTPATIENT)
Dept: DERMATOLOGY | Facility: CLINIC | Age: 21
End: 2021-02-22
Payer: COMMERCIAL

## 2021-02-22 PROCEDURE — 99072 ADDL SUPL MATRL&STAF TM PHE: CPT

## 2021-02-22 PROCEDURE — 99214 OFFICE O/P EST MOD 30 MIN: CPT

## 2021-02-22 RX ORDER — TRETINOIN 0.25 MG/G
0.03 CREAM TOPICAL
Qty: 1 | Refills: 1 | Status: DISCONTINUED | COMMUNITY
Start: 2019-03-26 | End: 2021-02-22

## 2021-02-22 RX ORDER — DOXYCYCLINE 100 MG/1
100 TABLET, FILM COATED ORAL
Qty: 120 | Refills: 0 | Status: DISCONTINUED | COMMUNITY
Start: 2020-10-26 | End: 2021-02-22

## 2021-03-01 ENCOUNTER — TRANSCRIPTION ENCOUNTER (OUTPATIENT)
Age: 21
End: 2021-03-01

## 2021-06-16 ENCOUNTER — APPOINTMENT (OUTPATIENT)
Dept: DERMATOLOGY | Facility: CLINIC | Age: 21
End: 2021-06-16
Payer: COMMERCIAL

## 2021-06-16 VITALS — WEIGHT: 140 LBS | BODY MASS INDEX: 21.22 KG/M2 | HEIGHT: 68 IN

## 2021-06-16 PROCEDURE — 99214 OFFICE O/P EST MOD 30 MIN: CPT

## 2021-06-16 RX ORDER — SPIRONOLACTONE 50 MG/1
50 TABLET ORAL
Qty: 1 | Refills: 2 | Status: DISCONTINUED | COMMUNITY
Start: 2021-02-22 | End: 2021-06-16

## 2021-09-20 ENCOUNTER — APPOINTMENT (OUTPATIENT)
Dept: DERMATOLOGY | Facility: CLINIC | Age: 21
End: 2021-09-20
Payer: COMMERCIAL

## 2021-09-20 VITALS — WEIGHT: 140 LBS | BODY MASS INDEX: 21.22 KG/M2 | HEIGHT: 68 IN

## 2021-09-20 PROCEDURE — 99213 OFFICE O/P EST LOW 20 MIN: CPT

## 2021-10-25 ENCOUNTER — APPOINTMENT (OUTPATIENT)
Dept: INTERNAL MEDICINE | Facility: CLINIC | Age: 21
End: 2021-10-25
Payer: COMMERCIAL

## 2021-10-25 VITALS
DIASTOLIC BLOOD PRESSURE: 77 MMHG | WEIGHT: 135 LBS | TEMPERATURE: 97.7 F | HEART RATE: 106 BPM | HEIGHT: 68 IN | OXYGEN SATURATION: 98 % | SYSTOLIC BLOOD PRESSURE: 122 MMHG | BODY MASS INDEX: 20.46 KG/M2

## 2021-10-25 DIAGNOSIS — Z23 ENCOUNTER FOR IMMUNIZATION: ICD-10-CM

## 2021-10-25 DIAGNOSIS — L24.89 IRRITANT CONTACT DERMATITIS DUE TO OTHER AGENTS: ICD-10-CM

## 2021-10-25 PROCEDURE — G0008: CPT

## 2021-10-25 PROCEDURE — 99395 PREV VISIT EST AGE 18-39: CPT | Mod: 25

## 2021-10-25 PROCEDURE — 90686 IIV4 VACC NO PRSV 0.5 ML IM: CPT

## 2021-10-25 RX ORDER — CHOLECALCIFEROL (VITAMIN D3) 1250 MCG
1.25 MG CAPSULE ORAL
Qty: 4 | Refills: 2 | Status: COMPLETED | COMMUNITY
Start: 2018-11-19 | End: 2021-10-25

## 2021-10-25 RX ORDER — TAZAROTENE 0.05 MG/G
0.05 CREAM CUTANEOUS
Qty: 1 | Refills: 3 | Status: COMPLETED | COMMUNITY
Start: 2021-09-20 | End: 2021-10-25

## 2021-10-25 RX ORDER — TRETINOIN 0.5 MG/G
0.05 CREAM TOPICAL
Qty: 1 | Refills: 11 | Status: COMPLETED | COMMUNITY
Start: 2019-10-04 | End: 2021-10-25

## 2021-10-25 NOTE — HEALTH RISK ASSESSMENT
[Excellent] : ~his/her~  mood as  excellent [No] : In the past 12 months have you used drugs other than those required for medical reasons? No [No falls in past year] : Patient reported no falls in the past year [0] : 2) Feeling down, depressed, or hopeless: Not at all (0) [PHQ-2 Negative - No further assessment needed] : PHQ-2 Negative - No further assessment needed [HIV test declined] : HIV test declined [Hepatitis C test offered] : Hepatitis C test offered [FreeTextEntry1] : none  [] : No [de-identified] : dermatology  [de-identified] : 2 times a week.  walking [de-identified] : reg [[id number]] : 0 [PapSmearDate] : never  [PapSmearComments] : never had sexual relations.

## 2021-10-25 NOTE — COUNSELING
[Sleep ___ hours/day] : Sleep [unfilled] hours/day [Engage in a relaxing activity] : Engage in a relaxing activity [Plan in advance] : Plan in advance [FreeTextEntry2] : bmi low   eat healthy     ideal weight 143 lbs  she is 135

## 2021-10-25 NOTE — PLAN
[FreeTextEntry1] : Influenza Virus Vaccine (Inactivated) (in floo EN za VYE sebastien vak SEEN, in ak ti RADHA deb)   COMMON USES:  It is used to prevent the flu.   HOW TO USE THIS MEDICINE:  HOW IS THIS DRUG BEST TAKEN? Use this drug as ordered by your doctor. Read all information given to you. Follow all instructions closely. It is given as a shot into a muscle. HOW DO I STORE AND/OR THROW OUT THIS DRUG? If you need to store this drug at home, talk with your doctor, nurse, or pharmacist about how to store it. WHAT DO I DO IF I MISS A DOSE? Call your doctor to find out what to do.   CAUTIONS:  Tell all of your health care providers that you take this drug. This includes your doctors, nurses, pharmacists, and dentists. If you have a latex allergy, talk with your doctor. If you are allergic to eggs, talk with the doctor. Like all vaccines, this vaccine may not fully protect all people who get it. If you have questions, talk with the doctor. This drug is a vaccine with a virus that is not active. It cannot cause the disease. This drug is not a cure for the flu. It must be given before you are exposed to the flu in order to work. Most of the time, it takes a few weeks for this drug to work. This drug only protects you for 1 flu season. You will need to get the flu vaccine each year. If you have a weak immune system or take drugs that weaken the immune system, talk with your doctor. This vaccine may not work as well. Not all brands of vaccines are for all children. Talk with your child's doctor. Some children may need to have more than 1 dose of this vaccine. Talk with your child's doctor. Some children have had a fever and seizures caused by fevers with some flu vaccines. Most of the time, this happened in children younger than 5 years of age. Fever has also been seen in children 5 to younger than 9 years of age. Talk with your child's doctor. Tell your doctor if you are pregnant, plan on getting pregnant, or are breast-feeding. You will need to talk about the benefits and risks to you and the baby.   POSSIBLE SIDE EFFECTS:  WHAT ARE SOME SIDE EFFECTS THAT I NEED TO CALL MY DOCTOR ABOUT RIGHT AWAY? WARNING/CAUTION: Even though it may be rare, some people may have very bad and sometimes deadly side effects when taking a drug. Tell your doctor or get medical help right away if you have any of the following signs or symptoms that may be related to a very bad side effect: Signs of an allergic reaction, like rash; hives; itching; red, swollen, blistered, or peeling skin with or without fever; wheezing; tightness in the chest or throat; trouble breathing, swallowing, or talking; unusual hoarseness; or swelling of the mouth, face, lips, tongue, or throat. A burning, numbness, or tingling feeling that is not normal. Not able to move face muscles as much. Trouble controlling body movements. Very bad dizziness or passing out. Muscle weakness. Seizures. Change in eyesight. WHAT ARE SOME OTHER SIDE EFFECTS OF THIS DRUG? All drugs may cause side effects. However, many people have no side effects or only have minor side effects. Call your doctor or get medical help if any of these side effects or any other side effects bother you or do not go away: For all patients taking this drug: Pain, redness, swelling, or other reaction where the injection was given. Headache. Muscle or joint pain. Feeling tired or weak. Chills. Young children: Mild fever. Upset stomach or throwing up. Stomach pain or diarrhea. Not hungry. Feeling sleepy. Feeling fussy. Crying that is not normal. These are not all of the side effects that may occur. If you have questions about side effects, call your doctor. Call your doctor for medical advice about side effects. Report side effects to the FDA/CDC Vaccine Adverse Event Reporting System (VAERS) at https://vaers.hhs.gov/reportevent.html or by calling 1-190.191.5219.   BEFORE USING THIS MEDICINE:  WHAT DO I NEED TO TELL MY DOCTOR BEFORE I TAKE THIS DRUG? TELL YOUR DOCTOR: If you are allergic to this drug; any part of this drug; or any other drugs, foods, or substances. Tell your doctor about the allergy and what signs you had. This drug may interact with other drugs or health problems. Tell your doctor and pharmacist about all of your drugs (prescription or OTC, natural products, vitamins) and health problems. You must check to make sure that it is safe for you to take this drug with all of your drugs and health problems. Do not start, stop, or change the dose of any drug without checking with your doctor.   OVERDOSE:  If you think there has been an overdose, call your poison control center or get medical care right away. Be ready to tell or show what was taken, how much, and when it happened.   ADDITIONAL INFORMATION:  If your symptoms or health problems do not get better or if they become worse, call your doctor. Do not share your drugs with others and do not take anyone else's drugs. Keep all drugs in a safe place. Keep all drugs out of the reach of children and pets. Throw away unused or  drugs. Do not flush down a toilet or pour down a drain unless you are told to do so. Check with your pharmacist if you have questions about the best way to throw out drugs. There may be drug take-back programs in your area. Some drugs may have another patient information leaflet. Check with your pharmacist. If you have any questions about this drug, please talk with your doctor, nurse, pharmacist, or other health care provider.   Copyright  [employer] All Rights Reserved.

## 2021-10-25 NOTE — HISTORY OF PRESENT ILLNESS
[FreeTextEntry1] : cpe  [de-identified] : :Pt is a 21 yr old woman with hx of acne vulgaris and being seen by dermatologist  .  She is here today for her annual exam.  Seh -denies any headaches, nausea, vomiting, fever, chills, sweats, chest pain, chest pressure, diarrhea, constipation, dysphagia, sour taste in the mouth, dizziness, leg swelling, leg pain, myalgias, arthralgias, itchy eyes, itchy ears, heartburn, or reflux. \par \par \par

## 2021-10-25 NOTE — ASSESSMENT
[FreeTextEntry1] : health -  she needs eye and dental exam and flu vaccine  bmi 20 and normal healthy eating  exercise at least 150-300 mins perweek.  \par 2 anemia check her cbc and iron and ferritin levels \par 3.  flu vaccine needed I discussed risks side effects and she would like vaccine no prior reaction noted.  \par 4.  acne  continue dermatology [facility] Medical therapies for acne target one or more of four key factors that promote the development of acne lesions: follicular hyperproliferation and abnormal desquamation, increased sebum production, Cutibacterium (formerly Propionibacterium) acnes proliferation, and inflammation (figure 1). (See "Pathogenesis, clinical manifestations, and diagnosis of acne vulgaris", section on 'Pathogenesis'.)\par \par These factors are targeted as follows [8]:\par \par ?Follicular hyperproliferation and abnormal desquamation\par \par \par •Topical retinoids\par \par •Oral retinoids\par \par •Azelaic acid\par \par •Salicylic acid\par \par •Hormonal therapies\par \par \par ?Increased sebum production\par \par \par •Oral isotretinoin\par \par •Hormonal therapies\par \par \par ?C. acnes proliferation \par \par \par •Benzoyl peroxide\par \par •Topical and oral antibiotics\par \par •Azelaic acid\par \par \par ?Inflammation\par \par \par •Oral isotretinoin\par \par •Oral tetracyclines\par \par •Topical retinoids\par \par •Azelaic acid\par \par \par In addition, the procedural therapies used as adjunctive therapies for acne target one or more contributory factors. The mechanisms of these interventions are reviewed separately. (See "Light-based, adjunctive, and other therapies for acne vulgaris".)\par \par Knowledge of mechanisms of action of acne therapies is combined with recognition of specific clinical features to determine the best approach to treatment. The following concepts guide the selection of therapy:\par \par ?Topical retinoids are beneficial for both comedonal (noninflammatory) (picture 1A-B) and inflammatory acne (picture 2A) and should be included in the initial management of most patients. Topical retinoids are effective in the treatment of comedonal acne due to their ability to normalize follicular hyperkeratosis and prevent formation of the microcomedo, the primary lesion of acne [9]. The efficacy of topical retinoids for inflammatory acne [10,11] may be due to a combination of intrinsic anti-inflammatory properties of topical retinoids and their ability to prevent the formation of microcomedones. Topical retinoids can be used as monotherapy in individuals with exclusively comedonal acne.\par \par \par ?Patients with an inflammatory component often benefit from antimicrobial therapies (eg, benzoyl peroxide or topical antibiotics). Antimicrobial agents reduce the number of proinflammatory C. acnes colonizing the skin. (See 'Topical antimicrobials' below.)\par \par \par ?Patients with moderate to severe inflammatory acne often warrant more aggressive treatment with oral antibiotics [8]. Antibiotics in the tetracycline class are most frequently used, and appear to have both antibacterial and anti-inflammatory properties. (See 'Oral antibiotics' below.)\par \par \par ?The use of benzoyl peroxide with topical or oral antibiotics decreases the emergence of antibiotic resistant bacteria. Therefore, use of benzoyl peroxide is recommended in patients receiving antibiotic therapy [7]. (See 'Oral antibiotics' below.)\par \par \par ?Androgens stimulate increased sebum production, which contributes to the formation of acne [9]. Hormonal therapy may benefit women with moderate to severe acne, even in the absence of a hyperandrogenic state. (See "Hormonal therapy for women with acne vulgaris".)\par \par \par ?Patients should be given realistic expectations regarding timelines for improvement. Improvement in acne is dependent upon both the prevention and resolution of acne papules, pustules, and nodules. At least two to three months of consistent adherence to a therapeutic regimen is often necessary prior to concluding that treatment is ineffective. Adjustments to the regimen also may be needed. \par \par \par ?Acne typically recurs over years, and maintenance therapy is an important component of acne management. (See 'Maintenance therapy' below.)\par \par \par \par APPROACH TO TREATMENT\par \par General approach — An example of an initial approach to acne based upon the principles above is outlined below. Additional therapeutic options are reviewed in a table derived from the 2016 acne treatment guidelines from the [employer] (table 2) [7]. \par \par ?Comedonal (noninflammatory) acne (picture 1A-B)\par \par \par •Topical retinoid (alternatives include azelaic acid and salicylic acid) \par \par \par ?Mild papulopustular and mixed (comedonal and papulopustular) acne (picture 2A-B)\par \par \par •Topical antimicrobial (eg, benzoyl peroxide alone or benzoyl peroxide +/- topical antibiotic) AND\par \par •Topical retinoid \par \par \par OR\par \par \par •Benzoyl peroxide AND topical antibiotic (for patients who cannot tolerate a retinoid or who require a simplified treatment regimen)\par \par \par ?Moderate papulopustular and mixed acne (picture 3)\par \par \par •Topical retinoid AND\par \par •Oral antibiotic AND\par \par •Topical benzoyl peroxide \par \par \par ?Severe acne (eg, nodular acne) (picture 4A-C)\par \par \par •Topical retinoid AND\par \par •Oral antibiotic AND\par \par •Topical benzoyl peroxide\par \par \par OR\par \par \par •Oral isotretinoin monotherapy (see 'Oral isotretinoin' below and "Oral isotretinoin therapy for acne vulgaris")\par \par \par Of note, oral hormonal therapy is an alternative to oral antibiotic therapy in postmenarchal females with moderate to severe acne (table 2). (See "Hormonal therapy for women with acne vulgaris".)\par \par Consistent adherence to acne therapy is critical for achieving clinical improvement. When recommending topical therapy, the clinician should design a regimen that is feasible for the patient. This should involve a discussion with the patient to ensure that each component of the treatment regimen is acceptable to the patient. We typically design regimens that require the patient to treat no more than once or twice daily. \par \par A sample regimen for a patient with mild inflammatory facial acne who is using a topical retinoid, topical benzoyl peroxide, and topical clindamycin is as follows:\par \par ?Morning: Wash face with a gentle facial cleanser. Apply a thin layer of a fixed-dose combination benzoyl peroxide/clindamycin gel to the entire face. (An alternative regimen could require the patient to wash the face with a benzoyl peroxide cleanser followed by application of a thin layer of topical clindamycin to the entire face.)\par \par \par ?Night: Wash face with a gentle facial cleanser. Apply a thin layer of the topical retinoid to the entire face.\par \par \par Some patients may prefer the use of fixed-dose combination products to simplify treatment (table 4). However, the cost of such treatments may be higher than single-active ingredient agents. (See 'Combination therapy' below.)\par \par The selection of the delivery system for topical acne medications also may help to improve the likelihood of adherence to treatment. The choice depends upon the patient's skin type (dry versus oily) and preference. Some gels have a drying effect; they may be preferred by patients with oily skin. Creams and lotions tend to be moisturizing. Solutions are drying but they cover large areas more easily than other preparations, and foams are easy to apply to hair-bearing areas. Pledgets are single-use absorbent pads impregnated with medication. They are convenient to use and facilitate the spreading of medication over large areas. \par \par Clinicians should be aware that further modifications to the vehicle of a drug may also affect characteristics such as efficacy, tolerability, or stability. As an example, the microsphere formulation of tretinoin consists of tretinoin contained in microscopic biodegradable spherical particles. As the microspheres are degraded, tretinoin is slowly and progressively delivered to the skin. This formulation is associated with improved drug stability and decreased irritation [12,13]. (See 'Topical retinoids' below.)\par \par Procedural therapies are sometimes used in conjunction with conventional medical therapy. These interventions are reviewed separately. (See "Light-based, adjunctive, and other therapies for acne vulgaris", section on 'Light/laser therapies' and "Light-based, adjunctive, and other therapies for acne vulgaris", section on 'Adjunctive therapies'.)\par \par Truncal acne — Although the treatment of both facial and truncal acne can be approached similarly, a challenge for the treatment of truncal acne is the difficulty associated with applying topical treatments to the entire affected area. Given the difficulty that patients may h\par 5 contact dermatitis  area on right forearm  apply steroidal cream to area.

## 2021-10-25 NOTE — PHYSICAL EXAM
[Well Developed] : well developed [Well Nourished] : well nourished [Conjunctiva] : the conjunctiva were normal in both eyes [PERRL] : pupils were equal in size, round, and reactive to light [EOM Intact] : extraocular movements were intact [Normal Appearance] : was normal in appearance [Neck Supple] : was supple [Rate ___] : at [unfilled] breaths per minute [Normal Rhythm/Effort] : normal respiratory rhythm and effort [Clear Bilaterally] : the lungs were clear to auscultation bilaterally [Normal to Percussion] : the lungs were normal to percussion [Heart Rate ___] : [unfilled] bpm [Rhythm Regular] : regular [Normal Rate] : normal [Normal S1] : normal S1 [Normal S2] : normal S2 [No Murmur] : no murmurs heard [No Pitting Edema] : no pitting edema present [2+] : left 2+ [No Abnormalities] : the abdominal aorta was not enlarged and no bruit was heard [Examination Of The Breasts] : a normal appearance [No Discharge] : no discharge [Soft, Nontender] : the abdomen was soft and nontender [No Mass] : no masses were palpated [No HSM] : no hepatosplenomegaly noted [No Lymphangitis] : no lymphangitis observed [Normal Kyphosis] : normal kyphosis [No Visual Abnormalities] : no visible abnormalities [Normal Lordosis] : normal lordosis [No Scoliosis] : no scoliosis [No Tenderness to Palpation] : no spine tenderness on palpation [No Masses] : no masses [Full ROM] : full ROM [No Pain with ROM] : no pain with motion in any direction [Intact] : all reflexes within normal limits bilaterally [Normal Station and Gait] : the gait and station were normal [Normal Motor Tone] : the muscle tone was normal [Involuntary Movements] : no involuntary movements were seen [Normal Scalp] : inspection of the scalp showed no abnormalities [Examination Of The Hair] : texture and distribution of hair was normal [Complexion Medium] : medium complexion [Skin Lesions 1] : Skin lesion: [Acne] : acneiform eruption [Lesions On Face] : on the face [Chest] : on the chest [Back] : on the back [Normal] : the deep tendon reflexes were normal [Normal Mental Status] : the patient's orientation, memory, attention, language and fund of knowledge were normal [Appropriate] : appropriate [Enlarged Diffusely] : was not enlarged [Rt] : no varicose veins of the right leg [Lt] : no varicose veins of the left leg [Bruit] : no bruit heard [S3] : no S3 [S4] : no S4 [Right Carotid Bruit] : no bruit heard over the right carotid [Left Carotid Bruit] : no bruit heard over the left carotid [Right Femoral Bruit] : no bruit heard over the right femoral artery [Left Femoral Bruit] : no bruit heard over the left femoral artery [Postauricular Lymph Nodes Enlarged Bilaterally] : nodes not enlarged [Preauricular Lymph Nodes Enlarged Bilaterally] : nodes not enlarged [Submandibular Lymph Nodes Enlarged Bilaterally] : nodes not enlarged [Suboccipital Lymph Nodes Enlarged Bilaterally] : nodes not enlarged [Submental Lymph Nodes Enlarged] : nodes not enlarged [Cervical Lymph Nodes Enlarged Posterior Bilaterally] : nodes not enlarged [Cervical Lymph Nodes Enlarged Anterior Bilaterally] : nodes not enlarged [Supraclavicular Lymph Nodes Enlarged Bilaterally] : nodes not enlarged [Axillary Lymph Nodes Enlarged Bilaterally] : nodes not enlarged [Epitrochlear Lymph Nodes Enlarged Bilaterally] : nodes not enlarged [Femoral Lymph Nodes Enlarged Bilaterally] : nodes not enlarged [Inguinal Lymph Nodes Enlarged Bilaterally] : nodes not enlarged [Abnormal Color] : normal color and pigmentation [Tattoo - Single] : no tattoos observed [Skin Turgor Decreased] : normal skin turgor [Impaired judgment] : intact judgment [Impaired Insight] : intact insight [de-identified] : front tooth  discolored  tongue normal   [FreeTextEntry1] : examined sitting and supine

## 2021-10-26 LAB
25(OH)D3 SERPL-MCNC: 20.1 NG/ML
ALBUMIN SERPL ELPH-MCNC: 4.8 G/DL
ALP BLD-CCNC: 61 U/L
ALT SERPL-CCNC: 8 U/L
ANION GAP SERPL CALC-SCNC: 12 MMOL/L
AST SERPL-CCNC: 15 U/L
BASOPHILS # BLD AUTO: 0.05 K/UL
BASOPHILS NFR BLD AUTO: 0.7 %
BILIRUB SERPL-MCNC: 0.6 MG/DL
BUN SERPL-MCNC: 10 MG/DL
CALCIUM SERPL-MCNC: 9.6 MG/DL
CHLORIDE SERPL-SCNC: 101 MMOL/L
CHOLEST SERPL-MCNC: 164 MG/DL
CO2 SERPL-SCNC: 24 MMOL/L
CREAT SERPL-MCNC: 0.72 MG/DL
EOSINOPHIL # BLD AUTO: 0.23 K/UL
EOSINOPHIL NFR BLD AUTO: 3.1 %
FERRITIN SERPL-MCNC: 22 NG/ML
GLUCOSE SERPL-MCNC: 76 MG/DL
HCT VFR BLD CALC: 36.8 %
HDLC SERPL-MCNC: 52 MG/DL
HGB BLD-MCNC: 11.2 G/DL
IMM GRANULOCYTES NFR BLD AUTO: 0.1 %
IRON SATN MFR SERPL: 19 %
IRON SERPL-MCNC: 63 UG/DL
LDLC SERPL CALC-MCNC: 98 MG/DL
LYMPHOCYTES # BLD AUTO: 1.71 K/UL
LYMPHOCYTES NFR BLD AUTO: 23.2 %
MAN DIFF?: NORMAL
MCHC RBC-ENTMCNC: 26.7 PG
MCHC RBC-ENTMCNC: 30.4 GM/DL
MCV RBC AUTO: 87.6 FL
MONOCYTES # BLD AUTO: 0.5 K/UL
MONOCYTES NFR BLD AUTO: 6.8 %
NEUTROPHILS # BLD AUTO: 4.88 K/UL
NEUTROPHILS NFR BLD AUTO: 66.1 %
NONHDLC SERPL-MCNC: 113 MG/DL
PLATELET # BLD AUTO: 188 K/UL
POTASSIUM SERPL-SCNC: 4.1 MMOL/L
PROT SERPL-MCNC: 8.1 G/DL
RBC # BLD: 4.2 M/UL
RBC # FLD: 13.6 %
SODIUM SERPL-SCNC: 137 MMOL/L
TIBC SERPL-MCNC: 331 UG/DL
TRIGL SERPL-MCNC: 75 MG/DL
UIBC SERPL-MCNC: 267 UG/DL
WBC # FLD AUTO: 7.38 K/UL

## 2021-10-27 LAB
APPEARANCE: CLEAR
BILIRUBIN URINE: NEGATIVE
BLOOD URINE: NEGATIVE
COLOR: NORMAL
GLUCOSE QUALITATIVE U: NEGATIVE
KETONES URINE: NEGATIVE
LEUKOCYTE ESTERASE URINE: NEGATIVE
NITRITE URINE: NEGATIVE
PH URINE: 6
PROTEIN URINE: NEGATIVE
SPECIFIC GRAVITY URINE: 1.01
UROBILINOGEN URINE: NORMAL

## 2021-11-05 ENCOUNTER — TRANSCRIPTION ENCOUNTER (OUTPATIENT)
Age: 21
End: 2021-11-05

## 2021-11-05 ENCOUNTER — NON-APPOINTMENT (OUTPATIENT)
Age: 21
End: 2021-11-05

## 2022-03-02 ENCOUNTER — APPOINTMENT (OUTPATIENT)
Dept: DERMATOLOGY | Facility: CLINIC | Age: 22
End: 2022-03-02
Payer: COMMERCIAL

## 2022-03-02 VITALS — WEIGHT: 140 LBS | BODY MASS INDEX: 21.22 KG/M2 | HEIGHT: 68 IN

## 2022-03-02 PROCEDURE — 99213 OFFICE O/P EST LOW 20 MIN: CPT

## 2022-03-02 RX ORDER — SPIRONOLACTONE 100 MG/1
100 TABLET ORAL
Qty: 3 | Refills: 1 | Status: DISCONTINUED | COMMUNITY
Start: 2021-06-16 | End: 2022-03-02

## 2022-07-06 ENCOUNTER — APPOINTMENT (OUTPATIENT)
Dept: DERMATOLOGY | Facility: CLINIC | Age: 22
End: 2022-07-06

## 2022-07-06 VITALS — WEIGHT: 133 LBS | HEIGHT: 67.5 IN | BODY MASS INDEX: 20.63 KG/M2

## 2022-07-06 PROCEDURE — 99214 OFFICE O/P EST MOD 30 MIN: CPT

## 2022-08-16 ENCOUNTER — APPOINTMENT (OUTPATIENT)
Dept: INTERNAL MEDICINE | Facility: CLINIC | Age: 22
End: 2022-08-16

## 2022-08-16 VITALS
BODY MASS INDEX: 20.32 KG/M2 | TEMPERATURE: 97 F | DIASTOLIC BLOOD PRESSURE: 67 MMHG | SYSTOLIC BLOOD PRESSURE: 105 MMHG | HEART RATE: 88 BPM | OXYGEN SATURATION: 99 % | WEIGHT: 131 LBS | HEIGHT: 67.5 IN

## 2022-08-16 DIAGNOSIS — R77.9 ABNORMALITY OF PLASMA PROTEIN, UNSPECIFIED: ICD-10-CM

## 2022-08-16 PROCEDURE — 99213 OFFICE O/P EST LOW 20 MIN: CPT

## 2022-08-16 NOTE — HEALTH RISK ASSESSMENT
[Never] : Never [No] : In the past 12 months have you used drugs other than those required for medical reasons? No [No falls in past year] : Patient reported no falls in the past year [0] : 2) Feeling down, depressed, or hopeless: Not at all (0) [PHQ-2 Negative - No further assessment needed] : PHQ-2 Negative - No further assessment needed [de-identified] : dermatology  Sr Roberts  [de-identified] : none walking  [de-identified] : healthier  [[id number]] : 0

## 2022-08-16 NOTE — HISTORY OF PRESENT ILLNESS
[FreeTextEntry1] : weight loss  [de-identified] : Pt has lost  weight  and was 140 in march and is now 131  and has lost 9lbs without trying.   she has changed her diet  and snacking less and avoiding  cookies and chocolate and ice cream due to how it affects her skin.She has not been exercising  .  she has moved her bedroom upstairs  in attic and keep her further from refrigerator.  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    She is not having diarrhea, insomnia,  nervousness or  intolerance to cold  or heat.  she doesn’t have muscle aches  , abdominal pain , gerd or feeling depressed.

## 2022-08-16 NOTE — END OF VISIT
[FreeTextEntry2] : resident  present  [Time Spent: ___ minutes] : I have spent [unfilled] minutes of time on the encounter. [>50% of the face to face encounter time was spent on counseling and/or coordination of care for ___] : Greater than 50% of the face to face encounter time was spent on counseling and/or coordination of care for [unfilled]

## 2022-08-16 NOTE — PHYSICAL EXAM
[No Acute Distress] : no acute distress [Well-Appearing] : well-appearing [Well Developed] : well developed [Well Nourished] : well nourished [Normal Voice Quality] : was normal [Normal Sclera/Conjunctiva] : normal sclera/conjunctiva [EOMI] : extraocular movements intact [20/___] : both eyes 20/[unfilled] [Conjunctiva] : the conjunctiva were normal in both eyes [PERRL] : pupils were equal in size, round, and reactive to light [EOM Intact] : extraocular movements were intact [Normal Outer Ear/Nose] : the outer ears and nose were normal in appearance [Normal Oropharynx] : the oropharynx was normal [Normal TMs] : both tympanic membranes were normal [Normal Nasal Mucosa] : the nasal mucosa was normal [No JVD] : no jugular venous distention [No Lymphadenopathy] : no lymphadenopathy [Supple] : supple [Thyroid Normal, No Nodules] : the thyroid was normal and there were no nodules present [Normal Appearance] : was normal in appearance [Neck Supple] : was supple [Enlarged Diffusely] : was not enlarged [JVP Elevated ___cm] : the JVP was not elevated [No Respiratory Distress] : no respiratory distress  [No Accessory Muscle Use] : no accessory muscle use [Clear to Auscultation] : lungs were clear to auscultation bilaterally [Normal Rhythm/Effort] : normal respiratory rhythm and effort [Clear Bilaterally] : the lungs were clear to auscultation bilaterally [Rales / Crackles Bilateral] : no rales or crackles were heard [Wheezing Bilaterally] : no wheezing was heard [Rhonchi Bilateral] : no rhonchi were heard [Decreased Breath Sounds] : breath sounds were not diminished [Pleural Friction Rub Bilaterally] : no friction rub heard [Bronchial Breath Sounds Bilaterally] : no bronchial breath sounds were heard [Normal to Percussion] : the lungs were normal to percussion [Regular Rhythm] : with a regular rhythm [Normal S1, S2] : normal S1 and S2 [No Carotid Bruits] : no carotid bruits [No Abdominal Bruit] : a ~M bruit was not heard ~T in the abdomen [No Varicosities] : no varicosities [Pedal Pulses Present] : the pedal pulses are present [No Edema] : there was no peripheral edema [No Palpable Aorta] : no palpable aorta [No Extremity Clubbing/Cyanosis] : no extremity clubbing/cyanosis [5th Left ICS - MCL] : palpated at the 5th LICS in the midclavicular line [Normal Rate] : normal [Heart Rate ___] : [unfilled] bpm [Rhythm Regular] : regular [Normal S1] : normal S1 [Normal S2] : normal S2 [S3] : no S3 [S4] : no S4 [No Murmur] : no murmurs heard [No Pitting Edema] : no pitting edema present [Rt] : no varicose veins of the right leg [Lt] : no varicose veins of the left leg [Right Carotid Bruit] : no bruit heard over the right carotid [Left Carotid Bruit] : no bruit heard over the left carotid [Right Femoral Bruit] : no bruit heard over the right femoral artery [Left Femoral Bruit] : no bruit heard over the left femoral artery [2+] : left 2+ [No Abnormalities] : the abdominal aorta was not enlarged and no bruit was heard [Bruit] : no bruit heard [Soft] : abdomen soft [Non Tender] : non-tender [Non-distended] : non-distended [Normal Bowel Sounds] : normal bowel sounds [Flat] : flat [Soft, Nontender] : the abdomen was soft and nontender [Raya's] : a negative Raya's sign [Rovsing's] : a negative Rovsing's sign [Obturator] : a negative obturator sign [Psoas] : a negative psoas sign [No Mass] : no masses were palpated [No HSM] : no hepatosplenomegaly noted [None] : no CVA tenderness [Normal Posterior Cervical Nodes] : no posterior cervical lymphadenopathy [Normal Anterior Cervical Nodes] : no anterior cervical lymphadenopathy [Cervical Lymph Nodes Enlarged Posterior Bilaterally] : nodes not enlarged [Supraclavicular Lymph Nodes Enlarged Bilaterally] : nodes not enlarged [Axillary Lymph Nodes Enlarged Bilaterally] : nodes not enlarged [Inguinal Lymph Nodes Enlarged Bilaterally] : nodes not enlarged [No CVA Tenderness] : no CVA  tenderness [No Spinal Tenderness] : no spinal tenderness [Normal Kyphosis] : normal kyphosis [No Visual Abnormalities] : no visible abnormalities [Normal Lordosis] : normal lordosis [No Scoliosis] : no scoliosis [No Tenderness to Palpation] : no spine tenderness on palpation [No Masses] : no masses [Full ROM] : full ROM [No Pain with ROM] : no pain with motion in any direction [Intact] : all reflexes within normal limits bilaterally [No Joint Swelling] : no joint swelling [Grossly Normal Strength/Tone] : grossly normal strength/tone [Normal Station and Gait] : the gait and station were normal [Normal Motor Tone] : the muscle tone was normal [Involuntary Movements] : no involuntary movements were seen [No Rash] : no rash [Abnormal Color] : normal color and pigmentation [Skin Lesions 1] : no skin lesions were observed [Skin Turgor Decreased] : normal skin turgor [Coordination Grossly Intact] : coordination grossly intact [No Focal Deficits] : no focal deficits [Normal Gait] : normal gait [Deep Tendon Reflexes (DTR)] : deep tendon reflexes were 2+ and symmetric [Normal] : the deep tendon reflexes were normal [Normal Affect] : the affect was normal [Normal Insight/Judgement] : insight and judgment were intact [Normal Mental Status] : the patient's orientation, memory, attention, language and fund of knowledge were normal [Appropriate] : appropriate [Impaired judgment] : intact judgment [Impaired Insight] : intact insight [de-identified] : teeth  missing left  front tooth    and bite has shifted to fill in   darkened  right  front tooth.

## 2022-08-16 NOTE — ASSESSMENT
[FreeTextEntry1] : 1  weight loss will check for thyroid disorder     but likely due to her sanchez ein diet and alos  the moving of her room away from refrigerator and not wanting to go down stairs \par 2 anemia check cbc iron ferritin b12  \par 3.  vit d   We discussed vit D and risks and understands the importance of taking the vitamin.  . Vitamin D is a nutrient found in some foods that is needed for health and to maintain strong bones. It does so by helping the body absorb calcium (one of bone’s main building blocks) from food and supplements. People who get too little vitamin D may develop soft, thin, and brittle bones, a condition known as rickets in children and osteomalacia in adults.\par \par Vitamin D is important to the body in many other ways as well. Muscles need it to move, for example, nerves need it to carry messages between the brain and every body part, and the immune system needs vitamin D to fight off invading bacteria and viruses. Together with calcium, vitamin D also helps protect older adults from osteoporosis. Vitamin D is found in cells throughout the body.\par \par How much vitamin D do I need?\par \par The amount of vitamin D you need each day depends on your age. Average daily recommended amounts from the Food and Nutrition Board (a national group of experts) for different ages are listed below in International Units (IU):\par \par \par Life Stage\par \par Recommended Amount\par \par \par Birth to 12 months 400 IU \par Children 1-13 years 600 IU \par Teens 14-18 years 600 IU \par Adults 19-70 years 600 IU \par Adults 71 years and older 800 IU \par Pregnant and breastfeeding women 600 IU \par   \par What foods provide vitamin D?\par \par Very few foods naturally have vitamin D. Fortified foods provide most of the vitamin D in American diets.\par •Fatty fish such as salmon, tuna, and mackerel are among the best sources.\par •Beef liver, cheese, and egg yolks provide small amounts.\par •Mushrooms provide some vitamin D. In some mushrooms that are newly available in stores, the vitamin D content is being boosted by exposing these mushrooms to ultraviolet light.\par •Almost all of the [place] milk supply is fortified with 400 IU of vitamin D per quart. But foods made from milk, like cheese and ice cream, are usually not fortified.\par •Vitamin D is added to many breakfast cereals and to some brands of orange juice, yogurt, margarine, and soy beverages; check the labels.\par \par Can I get vitamin D from the sun?\par \par The body makes vitamin D when skin is directly exposed to the sun, and most people meet at least some of their vitamin D needs this way. Skin exposed to sunshine indoors through a window will not produce vitamin D. Cloudy days, shade, and having dark-colored skin also cut down on the amount of vitamin D the skin makes.\par \par However, despite the importance of the sun to vitamin D synthesis, it is prudent to limit exposure of skin to sunlight in order to lower the risk for skin cancer. When out in the sun for more than a few minutes, wear protective clothing and apply sunscreen with an SPF (sun protection factor) of 8 or more. Tanning beds also cause the skin to make vitamin D, but pose similar risks for skin cancer.\par \par People who avoid the sun or who cover their bodies with sunscreen or clothing should include good sources of vitamin D in their diets or take a supplement. Recommended intakes of vitamin D are set on the assumption of little sun exposure.\par \par What kinds of vitamin D dietary supplements are available?\par \par Vitamin D is found in supplements (and fortified foods) in two different forms: D2 (ergocalciferol) and D3 (cholecalciferol). Both increase vitamin D in the blood.\par \par Am I getting enough vitamin D?\par \par Because vitamin D can come from sun, food, and supplements, the best measure of one’s vitamin D status is blood levels of a form known as 25-hydroxyvitamin D. Levels are described in either nanomoles per liter (nmol/L) or nanograms per milliliter (ng/mL), where 1 nmol/L = 0.4 ng/mL.\par \par In general, levels below 30 nmol/L (12 ng/mL) are too low for bone or overall health, and levels above 125 nmol/L (50 ng/mL) are probably too high. Levels of 50 nmol/L or above (20 ng/mL or above) are sufficient for most people.\par \par By these measures, some Americans are vitamin D deficient and almost no one has levels that are too high. In general, young people have higher blood levels of 25-hydroxyvitamin D than older people and males have higher levels than females. By race, non- blacks tend to have the lowest levels and non- whites the highest. The majority of Americans have blood levels lower than 75 nmol/L (30 ng/mL).\par \par Certain other groups may not get enough vitamin D:\par • infants, because human milk is a poor source of the nutrient.  infants should be given a supplement of 400 IU of vitamin D each day.\par •Older adults, because their skin doesn’t make vitamin D when exposed to sunlight as efficiently as when they were young, and their kidneys are less able to convert vitamin D to its active form.\par •People with dark skin, because their skin has less ability to produce vitamin D from the sun.\par •People with disorders such as Crohn’s disease or celiac disease who don’t handle fat properly, because vitamin D needs fat to be absorbed.\par •Obese people, because their body fat binds to some vitamin D and prevents it from getting into the blood.\par \par What happens if I don’t get enough vitamin D?\par \par People can become deficient in vitamin D because they don’t consume enough or absorb enough from food, their exposure to sunlight is limited, or their kidneys cannot convert vitamin D to its active form in the body. In children, vitamin D deficiency causes rickets, a condition in which the bones become soft and bend. It’s a rare disease but still occurs, especially among  infants and children. In adults, vitamin D deficiency leads to osteomalacia, causing bone pain and muscle weakness.\par \par What are some effects of vitamin D on health?\par \par Vitamin D is being studied for its possible connections to several diseases and medical problems, including diabetes, hypertension, and autoimmune conditions such as multiple sclerosis. Two of them discussed below are bone disorders and some types of cancer.\par \par Bone disorders\par \par As they get older, millions of people (mostly women, but men too) develop, or are at risk of, osteoporosis, condition in which bones become fragile and may fracture if one falls. It is one consequence of not getting enough calcium and vitamin D over the long term. Supplements of both vitamin D3 (at 700-800 IU/day) and calcium (500-1,200 mg/day) have been shown to reduce the risk of bone loss and fractures in elderly people aged 62-85 years. Men and women should talk with their healthcare providers about their needs for vitamin D (and calcium) as part of an overall plan to prevent or treat osteoporosis.\par \par Cancer\par \par Some studies suggest that vitamin D may protect against colon cancer and perhaps even cancers of the prostate and breast. But higher levels of vitamin D in the blood have also been linked to higher rates of pancreatic cancer. At this time, it’s too early to say whether low vitamin D status increases cancer risk and whether higher levels protect or even increase risk in some people.\par \par Can vitamin D be harmful?\par \par Yes, when amounts in the blood become too high. Signs of toxicity include nausea, vomiting, poor appetite, constipation, weakness, and weight loss. And by raising blood levels of calcium, too much vitamin D can cause confusion, disorientation, and problems with heart rhythm. Excess vitamin D can also damage the kidneys.\par \par The upper limit for vitamin D is 1,000 to 1,500 IU/day for infants, 2,500 to 3,000 IU/day for children 1-8 years, and 4,000 IU/day for children 9 years and older, adults, and pregnant and lactating teens and women. Vitamin D toxicity almost always occurs from overuse of supplements. Excessive sun exposure doesn’t cause vitamin D poisoning because the body limits the amount of this vitamin it produces.\par \par Are there any interactions with vitamin D that I should know about?\par \par Like most dietary supplements, vitamin D may interact or interfere with other medicines or supplements you might be taking. Here are several examples:\par •Prednisone and other corticosteroid medicines to reduce inflammation impair how the body handles vitamin D, which leads to lower calcium absorption an\par 4.  acne  Medical therapies for acne target one or more of four key factors that promote the development of acne lesions: follicular hyperproliferation and abnormal desquamation, increased sebum production, Cutibacterium (formerly Propionibacterium) acnes proliferation, and inflammation (figure 1). (See "Pathogenesis, clinical manifestations, and diagnosis of acne vulgaris", section on 'Pathogenesis'.)\par \par These factors are targeted as follows [8]:\par \par ?Follicular hyperproliferation and abnormal desquamation\par \par \par •Topical retinoids\par \par •Oral retinoids\par \par •Azelaic acid\par \par •Salicylic acid\par \par •Hormonal therapies\par \par \par ?Increased sebum production\par \par \par •Oral isotretinoin\par \par •Hormonal therapies\par \par \par ?C. acnes proliferation \par \par \par •Benzoyl peroxide\par \par •Topical and oral antibiotics\par \par •Azelaic acid\par \par \par ?Inflammation\par \par \par •Oral isotretinoin\par \par •Oral tetracyclines\par \par •Topical retinoids\par \par •Azelaic acid\par \par \par In addition, the procedural therapies used as adjunctive therapies for acne target one or more contributory factors. The mechanisms of these interventions are reviewed separately. (See "Light-based, adjunctive, and other therapies for acne vulgaris".)\par \par Knowledge of mechanisms of action of acne therapies is combined with recognition of specific clinical features to determine the best approach to treatment. The following concepts guide the selection of therapy:\par \par ?Topical retinoids are beneficial for both comedonal (noninflammatory) (picture 1A-B) and inflammatory acne (picture 2A) and should be included in the initial management of most patients. Topical retinoids are effective in the treatment of comedonal acne due to their ability to normalize follicular hyperkeratosis and prevent formation of the microcomedo, the primary lesion of acne [9]. The efficacy of topical retinoids for inflammatory acne [10,11] may be due to a combination of intrinsic anti-inflammatory properties of topical retinoids and their ability to prevent the formation of microcomedones. Topical retinoids can be used as monotherapy in individuals with exclusively comedonal acne.\par \par \par ?Patients with an inflammatory component often benefit from antimicrobial therapies (eg, benzoyl peroxide or topical antibiotics). Antimicrobial agents reduce the number of proinflammatory C. acnes colonizing the skin. (See 'Topical antimicrobials' below.)\par \par \par ?Patients with moderate to severe inflammatory acne often warrant more aggressive treatment with oral antibiotics [8]. Antibiotics in the tetracycline class are most frequently used, and appear to have both antibacterial and anti-inflammatory properties. (See 'Oral antibiotics' below.)\par \par \par ?The use of benzoyl peroxide with topical or oral antibiotics decreases the emergence of antibiotic resistant bacteria. Therefore, use of benzoyl peroxide is recommended in patients receiving antibiotic therapy [7]. (See 'Oral antibiotics' below.)\par \par \par ?Androgens stimulate increased sebum production, which contributes to the formation of acne [9]. Hormonal therapy may benefit women with moderate to severe acne, even in the absence of a hyperandrogenic state. (See "Hormonal therapy for women with acne vulgaris".)\par \par \par ?Patients should be given realistic expectations regarding timelines for improvement. Improvement in acne is dependent upon both the prevention and resolution of acne papules, pustules, and nodules. At least two to three months of consistent adherence to a therapeutic regimen is often necessary prior to concluding that treatment is ineffective. Adjustments to the regimen also may be needed. \par \par \par ?Acne typically recurs over years, and maintenance therapy is an important component of acne management. (See 'Maintenance therapy' below.)\par \par \par \par APPROACH TO TREATMENT\par \par General approach — An example of an initial approach to acne based upon the principles above is outlined below. Additional therapeutic options are reviewed in a table derived from the 2016 acne treatment guidelines from the American Academy of Dermatology (table 2) [7]. \par \par ?Comedonal (noninflammatory) acne (picture 1A-B)\par \par \par •Topical retinoid (alternatives include azelaic acid and salicylic acid) \par \par \par ?Mild papulopustular and mixed (comedonal and papulopustular) acne (picture 2A-B)\par \par \par •Topical antimicrobial (eg, benzoyl peroxide alone or benzoyl peroxide +/- topical antibiotic) AND\par \par •Topical retinoid \par \par \par OR\par \par \par •Benzoyl peroxide AND topical antibiotic (for patients who cannot tolerate a retinoid or who require a simplified treatment regimen)\par \par \par ?Moderate papulopustular and mixed acne (picture 3)\par \par \par •Topical retinoid AND\par \par •Oral antibiotic AND\par \par •Topical benzoyl peroxide \par \par \par ?Severe acne (eg, nodular acne) (picture 4A-C)\par \par \par •Topical retinoid AND\par \par •Oral antibiotic AND\par \par •Topical benzoyl peroxide\par \par \par OR\par \par \par •Oral isotretinoin monotherapy (see 'Oral isotretinoin' below and "Oral isotretinoin therapy for acne vulgaris")\par \par \par Of note, oral hormonal therapy is an alternative to oral antibiotic therapy in postmenarchal females with moderate to severe acne (table 2). (See "Hormonal therapy for women with acne vulgaris".)\par \par Consistent adherence to acne therapy is critical for achieving clinical improvement. When recommending topical therapy, the clinician should design a regimen that is feasible for the patient. This should involve a discussion with the patient to ensure that each component of the treatment regimen is acceptable to the patient. We typically design regimens that require the patient to treat no more than once or twice daily. \par \par A sample regimen for a patient with mild inflammatory facial acne who is using a topical retinoid, topical benzoyl peroxide, and topical clindamycin is as follows:\par \par ?Morning: Wash face with a gentle facial cleanser. Apply a thin layer of a fixed-dose combination benzoyl peroxide/clindamycin gel to the entire face. (An alternative regimen could require the patient to wash the face with a benzoyl peroxide cleanser followed by application of a thin layer of topical clindamycin to the entire face.)\par \par \par ?Night: Wash face with a gentle facial cleanser. Apply a thin layer of the topical retinoid to the entire face.\par \par \par Some patients may prefer the use of fixed-dose combination products to simplify treatment (table 4). However, the cost of such treatments may be higher than single-active ingredient agents. (See 'Combination therapy' below.)\par \par The selection of the delivery system for topical acne medications also may help to improve the likelihood of adherence to treatment. The choice depends upon the patient's skin type (dry versus oily) and preference. Some gels have a drying effect; they may be preferred by patients with oily skin. Creams and lotions tend to be moisturizing. Solutions are drying but they cover large areas more easily than other preparations, and foams are easy to apply to hair-bearing areas. Pledgets are single-use absorbent pads impregnated with medication. They are convenient to use and facilitate the spreading of medication over large areas. \par \par Clinicians should be aware that further modifications to the vehicle of a drug may also affect characteristics such as efficacy, tolerability, or stability. As an example, the microsphere formulation of tretinoin consists of tretinoin contained in microscopic biodegradable spherical particles. As the microspheres are degraded, tretinoin is slowly and progressively delivered to the skin. This formulation is associated with improved drug stability and decreased irritation [12,13]. (See 'Topical retinoids' below.)\par \par Procedural therapies are sometimes used in conjunction with conventional medical therapy. These interventions are reviewed separately. (See "Light-based, adjunctive, and other therapies for acne vulgaris", section on 'Light/laser therapies' and "Light-based, adjunctive, and other therapies for acne vulgaris", section on 'Adjunctive therapies'.)\par \par Truncal acne — Although the treatment of both facial and truncal acne can be approached similarly, a challenge for the treatment of truncal acne is the difficulty associated with applying topical treatments to the entire affected area. Given the difficulty that patients may h\par

## 2022-08-17 ENCOUNTER — TRANSCRIPTION ENCOUNTER (OUTPATIENT)
Age: 22
End: 2022-08-17

## 2022-08-17 LAB
25(OH)D3 SERPL-MCNC: 21.6 NG/ML
ALBUMIN SERPL ELPH-MCNC: 4.5 G/DL
ALP BLD-CCNC: 55 U/L
ALT SERPL-CCNC: 7 U/L
ANION GAP SERPL CALC-SCNC: 10 MMOL/L
AST SERPL-CCNC: 14 U/L
BASOPHILS # BLD AUTO: 0.05 K/UL
BASOPHILS NFR BLD AUTO: 0.7 %
BILIRUB SERPL-MCNC: 0.7 MG/DL
BUN SERPL-MCNC: 10 MG/DL
CALCIUM SERPL-MCNC: 9.8 MG/DL
CHLORIDE SERPL-SCNC: 103 MMOL/L
CHOLEST SERPL-MCNC: 155 MG/DL
CO2 SERPL-SCNC: 24 MMOL/L
CREAT SERPL-MCNC: 0.59 MG/DL
EGFR: 130 ML/MIN/1.73M2
EOSINOPHIL # BLD AUTO: 0.19 K/UL
EOSINOPHIL NFR BLD AUTO: 2.5 %
FERRITIN SERPL-MCNC: 27 NG/ML
FOLATE SERPL-MCNC: 14.5 NG/ML
GLUCOSE SERPL-MCNC: 96 MG/DL
HCT VFR BLD CALC: 36.8 %
HDLC SERPL-MCNC: 50 MG/DL
HGB BLD-MCNC: 11.3 G/DL
IMM GRANULOCYTES NFR BLD AUTO: 0.3 %
IRON SATN MFR SERPL: 25 %
IRON SERPL-MCNC: 78 UG/DL
LDLC SERPL CALC-MCNC: 94 MG/DL
LYMPHOCYTES # BLD AUTO: 1.75 K/UL
LYMPHOCYTES NFR BLD AUTO: 23.5 %
MAN DIFF?: NORMAL
MCHC RBC-ENTMCNC: 26.5 PG
MCHC RBC-ENTMCNC: 30.7 GM/DL
MCV RBC AUTO: 86.2 FL
MONOCYTES # BLD AUTO: 0.67 K/UL
MONOCYTES NFR BLD AUTO: 9 %
NEUTROPHILS # BLD AUTO: 4.78 K/UL
NEUTROPHILS NFR BLD AUTO: 64 %
NONHDLC SERPL-MCNC: 104 MG/DL
PLATELET # BLD AUTO: 157 K/UL
POTASSIUM SERPL-SCNC: 4.2 MMOL/L
PROT SERPL-MCNC: 8 G/DL
RBC # BLD: 4.27 M/UL
RBC # FLD: 14 %
SODIUM SERPL-SCNC: 137 MMOL/L
TIBC SERPL-MCNC: 318 UG/DL
TRIGL SERPL-MCNC: 53 MG/DL
TSH SERPL-ACNC: 0.96 UIU/ML
UIBC SERPL-MCNC: 240 UG/DL
VIT B12 SERPL-MCNC: 349 PG/ML
WBC # FLD AUTO: 7.46 K/UL

## 2022-08-18 LAB
HGB A MFR BLD: 97 %
HGB A2 MFR BLD: 3 %
HGB FRACT BLD-IMP: NORMAL

## 2022-10-26 ENCOUNTER — APPOINTMENT (OUTPATIENT)
Dept: DERMATOLOGY | Facility: CLINIC | Age: 22
End: 2022-10-26

## 2022-10-26 DIAGNOSIS — L70.0 ACNE VULGARIS: ICD-10-CM

## 2022-10-26 PROCEDURE — 99214 OFFICE O/P EST MOD 30 MIN: CPT

## 2022-10-26 RX ORDER — AZELAIC ACID 0.15 G/G
15 GEL TOPICAL
Qty: 1 | Refills: 5 | Status: ACTIVE | COMMUNITY
Start: 2022-10-26 | End: 1900-01-01

## 2022-10-29 ENCOUNTER — TRANSCRIPTION ENCOUNTER (OUTPATIENT)
Age: 22
End: 2022-10-29

## 2022-10-29 PROBLEM — L70.0 ACNE VULGARIS: Status: ACTIVE | Noted: 2018-11-15

## 2022-11-01 ENCOUNTER — TRANSCRIPTION ENCOUNTER (OUTPATIENT)
Age: 22
End: 2022-11-01

## 2023-01-31 ENCOUNTER — APPOINTMENT (OUTPATIENT)
Dept: INTERNAL MEDICINE | Facility: CLINIC | Age: 23
End: 2023-01-31
Payer: COMMERCIAL

## 2023-01-31 PROCEDURE — 99442: CPT

## 2023-01-31 NOTE — HISTORY OF PRESENT ILLNESS
[Home] : at home, [unfilled] , at the time of the visit. [Medical Office: ([facility])___] : at the medical office located in  [Verbal consent obtained from patient] : the patient, [unfilled] [FreeTextEntry1] :   Pt  states she had  sore throat on Sunday  and feeling  fairly well until  Monday had headache weak malaise no fever  sob  or cough  nausea  or diarrhea. she has congestion     No wheezing .  She took a tyelnol   for her muscle aches  .  COVID19 Precautions:\par - Clean your hands often. Wash your hands often with soap and water for at least 20 seconds, especially after blowing your nose, coughing, or sneezing, or having been in a public place.\par - If soap and water are not available, use a hand  that contains at least 60% alcohol.\par - To the extent possible, avoid touching high-touch surfaces in public places - elevator buttons, door handles, handrails, handshaking with people, etc. Use a tissue or your sleeve to cover your hand or finger if you must touch something.\par - Wash your hands after touching surfaces in public places.\par - Avoid touching your face, nose, eyes, etc.\par - Clean and disinfect your home to remove germs: practice routine cleaning of frequently touched surfaces (for example: tables, doorknobs, light switches, handles, desks, toilets, faucets, sinks & cell phones)\par - Avoid crowds, especially in poorly ventilated spaces. Your risk of exposure to respiratory viruses like COVID-19 may increase in crowded, closed-in settings with little air circulation if there are people in the crowd who are sick. All patients are recommended to practice social distancing and stay at least 6 feet away from others.\par - Avoid all non-essential travel including plane trips, and especially avoid embarking on cruise ships.\par -If COVID-19 is spreading in your community, take extra measures to put distance between yourself and other people to further reduce your risk of being exposed to this new virus.\par -Stay home as much as possible.\par - Consider ways of getting food brought to your house through family, social, or commercial networks\par -Be aware that the virus has been known to live in the air up to 3 hours post exposure, cardboard up to 24 hours post exposure, copper up to 4 hours post exposure, steel and plastic up to 2-3 days post exposure. Risk of transmission from these surfaces are affected by many variables.\par Immune Support Recommendations:\par -OTC Vitamin C 500mg BID \par -OTC Quercetin 250-500mg BID \par -OTC Zinc 75-100mg per day \par -OTC Melatonin 1 or 2 mg a night \par -OTC Vitamin D 1-4000mg per day \par -OTC Tonic Water 8oz per day\par   mucinex  dm  for cough and congestion   She tested positive yesterday.    she lives  with her inlaws    If you were exposed to covid but have no symptoms  wait  5 full days  after exposure  before testing yourself .    if neg retest in 1 to 2 days later . Usually a person will test positive once they have symptoms  There have been different incubation   periods for covid 19 ranging  from 2-14 days.    However various strains of the virus have different incubation periods   The omicron BA1 has a shorter incubation period  .  Most experienced symptoms after 2-3 days.      [Time Spent: ___ minutes] : I have spent [unfilled] minutes with the patient on the telephone

## 2023-03-06 ENCOUNTER — APPOINTMENT (OUTPATIENT)
Dept: DERMATOLOGY | Facility: CLINIC | Age: 23
End: 2023-03-06

## 2023-03-14 ENCOUNTER — ASOB RESULT (OUTPATIENT)
Age: 23
End: 2023-03-14

## 2023-03-14 ENCOUNTER — APPOINTMENT (OUTPATIENT)
Dept: OBGYN | Facility: CLINIC | Age: 23
End: 2023-03-14
Payer: COMMERCIAL

## 2023-03-14 VITALS
DIASTOLIC BLOOD PRESSURE: 73 MMHG | BODY MASS INDEX: 19.7 KG/M2 | HEART RATE: 97 BPM | RESPIRATION RATE: 18 BRPM | SYSTOLIC BLOOD PRESSURE: 118 MMHG | OXYGEN SATURATION: 98 % | TEMPERATURE: 98.8 F | HEIGHT: 67.5 IN | WEIGHT: 127 LBS

## 2023-03-14 LAB
BILIRUB UR QL STRIP: NEGATIVE
CLARITY UR: NORMAL
COLLECTION METHOD: NORMAL
GLUCOSE UR-MCNC: NEGATIVE
HCG UR QL: 0.2 EU/DL
HGB UR QL STRIP.AUTO: NEGATIVE
KETONES UR-MCNC: NEGATIVE
LEUKOCYTE ESTERASE UR QL STRIP: NORMAL
NITRITE UR QL STRIP: NEGATIVE
PH UR STRIP: 6
PROT UR STRIP-MCNC: NORMAL
SP GR UR STRIP: 1.03

## 2023-03-14 PROCEDURE — 36415 COLL VENOUS BLD VENIPUNCTURE: CPT

## 2023-03-14 PROCEDURE — 76817 TRANSVAGINAL US OBSTETRIC: CPT

## 2023-03-14 PROCEDURE — 99204 OFFICE O/P NEW MOD 45 MIN: CPT

## 2023-03-14 PROCEDURE — 81025 URINE PREGNANCY TEST: CPT

## 2023-03-14 NOTE — PLAN
[FreeTextEntry1] : pt here for initial prenatal visit. \par \par Discussed following with patient:\par 1. Patient was oriented to the practice and goal/plan of care. \par 2. Adequate/optimal weight gain during pregnancy discussed. Nutrition counseling provided. \par 3. Diet precautions were reviewed including food borne infections such as listeria, salmonella. Advised to avoid high mercury content fish. \par 4. Exercise guidelines discussed. encouraged at least 30minute/day moderate intensity exercise/activity 5 times a week as tolerated. Advised to avoid any activities that may involve fall, direct trauma. Advised to avoid hot tubs or saunas. \par 5. Travel plans discussed. \par 6. COVID-19 discussed. General precautions reviewed. Pregnancy specific issues such as risk of infection on pregnant patients, vertical transmission and hospital policy discussed. \par 7. Aneuploidy test including screening tests (NIPT, sequential screen) and diagnostic tests (CVS, amniocentesis) discussed. Through shared decision making patient decided to get NT, NIPT. \par 8. nausea and vomiting in pregnancy: lifestyle modifications discussed. unison/pyridoxine recommended. \par 9. routine prenatal labs, pap/hpv sent\par \par RTC in 4wks\par All questions were answered to patient's satisfaction. Patient verbalized understanding.\par Overall 45 minutes were spent with the patient during this visit\par Avel RAI\par

## 2023-03-14 NOTE — HISTORY OF PRESENT ILLNESS
[FreeTextEntry1] : 21yo P0 LMP 1/26/23 here for positive pregnancy test.\par n/v. no vb or cramping. \par

## 2023-03-15 LAB
ABO + RH PNL BLD: NORMAL
BASOPHILS # BLD AUTO: 0.08 K/UL
BASOPHILS NFR BLD AUTO: 0.7 %
BLD GP AB SCN SERPL QL: NORMAL
C TRACH RRNA SPEC QL NAA+PROBE: NOT DETECTED
EOSINOPHIL # BLD AUTO: 0.16 K/UL
EOSINOPHIL NFR BLD AUTO: 1.4 %
ESTIMATED AVERAGE GLUCOSE: 100 MG/DL
HBA1C MFR BLD HPLC: 5.1 %
HBV SURFACE AG SER QL: NONREACTIVE
HCT VFR BLD CALC: 38.4 %
HCV AB SER QL: NONREACTIVE
HCV S/CO RATIO: 0.09 S/CO
HGB A MFR BLD: 97 %
HGB A2 MFR BLD: 3 %
HGB BLD-MCNC: 12.2 G/DL
HGB FRACT BLD-IMP: NORMAL
HIV1+2 AB SPEC QL IA.RAPID: NONREACTIVE
IMM GRANULOCYTES NFR BLD AUTO: 0.5 %
LYMPHOCYTES # BLD AUTO: 1.47 K/UL
LYMPHOCYTES NFR BLD AUTO: 13.1 %
MAN DIFF?: NORMAL
MCHC RBC-ENTMCNC: 27.8 PG
MCHC RBC-ENTMCNC: 31.8 GM/DL
MCV RBC AUTO: 87.5 FL
MEV IGG FLD QL IA: 106 AU/ML
MEV IGG+IGM SER-IMP: POSITIVE
MONOCYTES # BLD AUTO: 0.83 K/UL
MONOCYTES NFR BLD AUTO: 7.4 %
N GONORRHOEA RRNA SPEC QL NAA+PROBE: NOT DETECTED
NEUTROPHILS # BLD AUTO: 8.58 K/UL
NEUTROPHILS NFR BLD AUTO: 76.9 %
PLATELET # BLD AUTO: 159 K/UL
RBC # BLD: 4.39 M/UL
RBC # FLD: 14.4 %
RUBV IGG FLD-ACNC: 0.8 INDEX
RUBV IGG SER-IMP: NEGATIVE
SOURCE TP AMPLIFICATION: NORMAL
T PALLIDUM AB SER QL IA: NEGATIVE
VZV AB TITR SER: NEGATIVE
VZV IGG SER IF-ACNC: 17.6 INDEX
WBC # FLD AUTO: 11.18 K/UL

## 2023-03-16 LAB
BACTERIA UR CULT: NORMAL
LEAD BLD-MCNC: <1 UG/DL

## 2023-03-17 LAB — FMR1 GENE MUT ANL BLD/T: NORMAL

## 2023-03-20 LAB
AR GENE MUT ANL BLD/T: NORMAL
CYTOLOGY CVX/VAG DOC THIN PREP: NORMAL

## 2023-03-21 LAB — CFTR MUT TESTED BLD/T: NEGATIVE

## 2023-03-24 ENCOUNTER — TRANSCRIPTION ENCOUNTER (OUTPATIENT)
Age: 23
End: 2023-03-24

## 2023-03-27 ENCOUNTER — APPOINTMENT (OUTPATIENT)
Dept: OBGYN | Facility: CLINIC | Age: 23
End: 2023-03-27

## 2023-04-11 ENCOUNTER — APPOINTMENT (OUTPATIENT)
Dept: OBGYN | Facility: CLINIC | Age: 23
End: 2023-04-11
Payer: COMMERCIAL

## 2023-04-11 VITALS
HEART RATE: 103 BPM | RESPIRATION RATE: 18 BRPM | TEMPERATURE: 98.6 F | HEIGHT: 67.5 IN | SYSTOLIC BLOOD PRESSURE: 127 MMHG | BODY MASS INDEX: 20.01 KG/M2 | DIASTOLIC BLOOD PRESSURE: 77 MMHG | OXYGEN SATURATION: 98 % | WEIGHT: 129 LBS

## 2023-04-11 LAB
BILIRUB UR QL STRIP: NEGATIVE
CLARITY UR: CLEAR
COLLECTION METHOD: NORMAL
GLUCOSE UR-MCNC: NEGATIVE
HCG UR QL: 0.2 EU/DL
HGB UR QL STRIP.AUTO: NEGATIVE
KETONES UR-MCNC: NEGATIVE
LEUKOCYTE ESTERASE UR QL STRIP: NEGATIVE
NITRITE UR QL STRIP: NEGATIVE
PH UR STRIP: 6.5
PROT UR STRIP-MCNC: NEGATIVE
SP GR UR STRIP: 1.02

## 2023-04-11 PROCEDURE — 36415 COLL VENOUS BLD VENIPUNCTURE: CPT

## 2023-04-11 PROCEDURE — 0500F INITIAL PRENATAL CARE VISIT: CPT

## 2023-04-18 LAB
CHROMOSOME13 INTERPRETATION: NORMAL
CHROMOSOME13 TEST RESULT: NORMAL
CHROMOSOME18 INTERPRETATION: NORMAL
CHROMOSOME18 TEST RESULT: NORMAL
CHROMOSOME21 INTERPRETATION: NORMAL
CHROMOSOME21 TEST RESULT: NORMAL
FETAL FRACTION: NORMAL
MICRODELETIONS INTERPRETATION: NORMAL
MICRODELETIONS RESULT: NORMAL
PERFORMANCE AND LIMITATIONS: NORMAL
SEX CHROMOSOME INTERPRETATION: NORMAL
SEX CHROMOSOME TEST RESULT: NORMAL
VERIFI WITH MICRODELETIONS: NOT DETECTED

## 2023-05-01 ENCOUNTER — TRANSCRIPTION ENCOUNTER (OUTPATIENT)
Age: 23
End: 2023-05-01

## 2023-05-18 ENCOUNTER — APPOINTMENT (OUTPATIENT)
Dept: OBGYN | Facility: CLINIC | Age: 23
End: 2023-05-18
Payer: COMMERCIAL

## 2023-05-18 VITALS
RESPIRATION RATE: 18 BRPM | TEMPERATURE: 98.3 F | HEART RATE: 83 BPM | SYSTOLIC BLOOD PRESSURE: 102 MMHG | BODY MASS INDEX: 21.1 KG/M2 | WEIGHT: 136 LBS | OXYGEN SATURATION: 96 % | DIASTOLIC BLOOD PRESSURE: 67 MMHG | HEIGHT: 67.5 IN

## 2023-05-18 DIAGNOSIS — Z34.91 ENCOUNTER FOR SUPERVISION OF NORMAL PREGNANCY, UNSPECIFIED, FIRST TRIMESTER: ICD-10-CM

## 2023-05-18 DIAGNOSIS — Z32.01 ENCOUNTER FOR PREGNANCY TEST, RESULT POSITIVE: ICD-10-CM

## 2023-05-18 DIAGNOSIS — O21.9 VOMITING OF PREGNANCY, UNSPECIFIED: ICD-10-CM

## 2023-05-18 PROCEDURE — 36415 COLL VENOUS BLD VENIPUNCTURE: CPT

## 2023-05-18 PROCEDURE — 0502F SUBSEQUENT PRENATAL CARE: CPT

## 2023-05-19 PROBLEM — Z34.91 PRENATAL CARE IN FIRST TRIMESTER: Status: RESOLVED | Noted: 2023-04-11 | Resolved: 2023-05-19

## 2023-05-19 PROBLEM — Z32.01 POSITIVE PREGNANCY TEST: Status: RESOLVED | Noted: 2023-03-14 | Resolved: 2023-05-19

## 2023-05-19 PROBLEM — O21.9 NAUSEA AND VOMITING OF PREGNANCY, ANTEPARTUM: Status: RESOLVED | Noted: 2023-03-14 | Resolved: 2023-05-19

## 2023-05-22 LAB
BILIRUB UR QL STRIP: NEGATIVE
CLARITY UR: CLEAR
COLLECTION METHOD: NORMAL
GLUCOSE UR-MCNC: NEGATIVE
HCG UR QL: 0.2 EU/DL
HGB UR QL STRIP.AUTO: NEGATIVE
KETONES UR-MCNC: NEGATIVE
LEUKOCYTE ESTERASE UR QL STRIP: NEGATIVE
NITRITE UR QL STRIP: NEGATIVE
PH UR STRIP: 7
PROT UR STRIP-MCNC: NEGATIVE
SP GR UR STRIP: 1.02

## 2023-05-24 LAB
AFP MOM: 1.07
AFP VALUE: 39.3 NG/ML
ALPHA FETOPROTEIN SERUM COMMENT: NORMAL
ALPHA FETOPROTEIN SERUM INTERPRETATION: NORMAL
ALPHA FETOPROTEIN SERUM RESULTS: NORMAL
ALPHA FETOPROTEIN SERUM TEST RESULTS: NORMAL
GESTATIONAL AGE BASED ON: NORMAL
GESTATIONAL AGE ON COLLECTION DATE: 16 WEEKS
INSULIN DEP DIABETES: NO
MATERNAL AGE AT EDD AFP: 24.4 YR
MULTIPLE GESTATION: NO
OSBR RISK 1 IN: 9862
RACE: NORMAL
WEIGHT AFP: 136 LBS

## 2023-06-13 ENCOUNTER — APPOINTMENT (OUTPATIENT)
Dept: ANTEPARTUM | Facility: CLINIC | Age: 23
End: 2023-06-13
Payer: COMMERCIAL

## 2023-06-13 ENCOUNTER — ASOB RESULT (OUTPATIENT)
Age: 23
End: 2023-06-13

## 2023-06-13 PROCEDURE — 76811 OB US DETAILED SNGL FETUS: CPT

## 2023-06-15 ENCOUNTER — APPOINTMENT (OUTPATIENT)
Dept: INTERNAL MEDICINE | Facility: CLINIC | Age: 23
End: 2023-06-15

## 2023-06-22 ENCOUNTER — APPOINTMENT (OUTPATIENT)
Dept: OBGYN | Facility: CLINIC | Age: 23
End: 2023-06-22
Payer: COMMERCIAL

## 2023-06-22 VITALS
TEMPERATURE: 98 F | SYSTOLIC BLOOD PRESSURE: 107 MMHG | HEART RATE: 103 BPM | OXYGEN SATURATION: 98 % | RESPIRATION RATE: 18 BRPM | BODY MASS INDEX: 22.96 KG/M2 | HEIGHT: 67.5 IN | WEIGHT: 148 LBS | DIASTOLIC BLOOD PRESSURE: 68 MMHG

## 2023-06-22 PROCEDURE — 0502F SUBSEQUENT PRENATAL CARE: CPT

## 2023-06-23 ENCOUNTER — ASOB RESULT (OUTPATIENT)
Age: 23
End: 2023-06-23

## 2023-06-23 ENCOUNTER — APPOINTMENT (OUTPATIENT)
Dept: ANTEPARTUM | Facility: CLINIC | Age: 23
End: 2023-06-23
Payer: COMMERCIAL

## 2023-06-23 PROCEDURE — 76816 OB US FOLLOW-UP PER FETUS: CPT

## 2023-06-26 ENCOUNTER — TRANSCRIPTION ENCOUNTER (OUTPATIENT)
Age: 23
End: 2023-06-26

## 2023-06-26 LAB
BILIRUB UR QL STRIP: NEGATIVE
CLARITY UR: CLEAR
COLLECTION METHOD: NORMAL
GLUCOSE UR-MCNC: NEGATIVE
HCG UR QL: 0.2 EU/DL
HGB UR QL STRIP.AUTO: NEGATIVE
KETONES UR-MCNC: NEGATIVE
LEUKOCYTE ESTERASE UR QL STRIP: NORMAL
NITRITE UR QL STRIP: NEGATIVE
PH UR STRIP: 6
PROT UR STRIP-MCNC: NORMAL
SP GR UR STRIP: 1.03

## 2023-07-20 ENCOUNTER — APPOINTMENT (OUTPATIENT)
Dept: OBGYN | Facility: CLINIC | Age: 23
End: 2023-07-20
Payer: COMMERCIAL

## 2023-07-20 VITALS
HEART RATE: 87 BPM | BODY MASS INDEX: 22.96 KG/M2 | HEIGHT: 67.5 IN | DIASTOLIC BLOOD PRESSURE: 71 MMHG | TEMPERATURE: 98.6 F | RESPIRATION RATE: 16 BRPM | OXYGEN SATURATION: 97 % | WEIGHT: 148 LBS | SYSTOLIC BLOOD PRESSURE: 113 MMHG

## 2023-07-20 PROCEDURE — 36415 COLL VENOUS BLD VENIPUNCTURE: CPT

## 2023-07-20 PROCEDURE — 0502F SUBSEQUENT PRENATAL CARE: CPT

## 2023-07-21 ENCOUNTER — TRANSCRIPTION ENCOUNTER (OUTPATIENT)
Age: 23
End: 2023-07-21

## 2023-07-21 LAB — GLUCOSE 1H P 50 G GLC PO SERPL-MCNC: 88 MG/DL

## 2023-07-21 RX ORDER — CHLORHEXIDINE GLUCONATE 4 %
325 (65 FE) LIQUID (ML) TOPICAL TWICE DAILY
Qty: 60 | Refills: 6 | Status: ACTIVE | COMMUNITY
Start: 2023-07-21 | End: 1900-01-01

## 2023-08-14 ENCOUNTER — TRANSCRIPTION ENCOUNTER (OUTPATIENT)
Age: 23
End: 2023-08-14

## 2023-08-31 ENCOUNTER — APPOINTMENT (OUTPATIENT)
Dept: OBGYN | Facility: CLINIC | Age: 23
End: 2023-08-31
Payer: COMMERCIAL

## 2023-08-31 ENCOUNTER — ASOB RESULT (OUTPATIENT)
Age: 23
End: 2023-08-31

## 2023-08-31 DIAGNOSIS — Z34.92 ENCOUNTER FOR SUPERVISION OF NORMAL PREGNANCY, UNSPECIFIED, SECOND TRIMESTER: ICD-10-CM

## 2023-08-31 DIAGNOSIS — O99.019 ANEMIA COMPLICATING PREGNANCY, UNSPECIFIED TRIMESTER: ICD-10-CM

## 2023-08-31 LAB
BILIRUB UR QL STRIP: NEGATIVE
CLARITY UR: CLEAR
COLLECTION METHOD: NORMAL
GLUCOSE UR-MCNC: NEGATIVE
HCG UR QL: 0.2 EU/DL
HGB UR QL STRIP.AUTO: NEGATIVE
KETONES UR-MCNC: NEGATIVE
LEUKOCYTE ESTERASE UR QL STRIP: NORMAL
NITRITE UR QL STRIP: NEGATIVE
PH UR STRIP: 5.5
PROT UR STRIP-MCNC: NEGATIVE
SP GR UR STRIP: 1.03

## 2023-08-31 PROCEDURE — 76816 OB US FOLLOW-UP PER FETUS: CPT

## 2023-08-31 PROCEDURE — 0502F SUBSEQUENT PRENATAL CARE: CPT

## 2023-09-14 ENCOUNTER — NON-APPOINTMENT (OUTPATIENT)
Age: 23
End: 2023-09-14

## 2023-09-14 ENCOUNTER — APPOINTMENT (OUTPATIENT)
Dept: OBGYN | Facility: CLINIC | Age: 23
End: 2023-09-14
Payer: COMMERCIAL

## 2023-09-14 VITALS
RESPIRATION RATE: 16 BRPM | DIASTOLIC BLOOD PRESSURE: 66 MMHG | BODY MASS INDEX: 24.98 KG/M2 | OXYGEN SATURATION: 96 % | HEIGHT: 67.5 IN | SYSTOLIC BLOOD PRESSURE: 102 MMHG | HEART RATE: 96 BPM | WEIGHT: 161 LBS | TEMPERATURE: 98.2 F

## 2023-09-14 LAB
BILIRUB UR QL STRIP: NORMAL
CLARITY UR: CLEAR
COLLECTION METHOD: NORMAL
GLUCOSE UR-MCNC: NORMAL
HCG UR QL: 0.2 EU/DL
HGB UR QL STRIP.AUTO: NORMAL
KETONES UR-MCNC: NORMAL
LEUKOCYTE ESTERASE UR QL STRIP: ABNORMAL
NITRITE UR QL STRIP: NORMAL
PH UR STRIP: 6.5
PROT UR STRIP-MCNC: NORMAL
SP GR UR STRIP: 1.02

## 2023-09-14 PROCEDURE — 0502F SUBSEQUENT PRENATAL CARE: CPT

## 2023-09-28 ENCOUNTER — APPOINTMENT (OUTPATIENT)
Dept: OBGYN | Facility: CLINIC | Age: 23
End: 2023-09-28
Payer: COMMERCIAL

## 2023-09-28 VITALS
OXYGEN SATURATION: 96 % | WEIGHT: 161 LBS | DIASTOLIC BLOOD PRESSURE: 68 MMHG | SYSTOLIC BLOOD PRESSURE: 109 MMHG | HEART RATE: 90 BPM | RESPIRATION RATE: 14 BRPM | HEIGHT: 67.5 IN | TEMPERATURE: 98 F | BODY MASS INDEX: 24.98 KG/M2

## 2023-09-28 LAB
BILIRUB UR QL STRIP: NEGATIVE
CLARITY UR: CLEAR
COLLECTION METHOD: NORMAL
GLUCOSE UR-MCNC: NEGATIVE
HCG UR QL: 0.2 EU/DL
HGB UR QL STRIP.AUTO: NEGATIVE
KETONES UR-MCNC: NEGATIVE
LEUKOCYTE ESTERASE UR QL STRIP: NORMAL
NITRITE UR QL STRIP: NEGATIVE
PH UR STRIP: 6
PROT UR STRIP-MCNC: NEGATIVE
SP GR UR STRIP: 1.02

## 2023-09-28 PROCEDURE — 0502F SUBSEQUENT PRENATAL CARE: CPT

## 2023-09-28 PROCEDURE — 81003 URINALYSIS AUTO W/O SCOPE: CPT | Mod: QW

## 2023-10-03 ENCOUNTER — OUTPATIENT (OUTPATIENT)
Dept: OUTPATIENT SERVICES | Facility: HOSPITAL | Age: 23
LOS: 1 days | End: 2023-10-03
Payer: COMMERCIAL

## 2023-10-03 DIAGNOSIS — O26.899 OTHER SPECIFIED PREGNANCY RELATED CONDITIONS, UNSPECIFIED TRIMESTER: ICD-10-CM

## 2023-10-03 DIAGNOSIS — Z3A.00 WEEKS OF GESTATION OF PREGNANCY NOT SPECIFIED: ICD-10-CM

## 2023-10-03 PROCEDURE — G0463: CPT

## 2023-10-03 PROCEDURE — 59025 FETAL NON-STRESS TEST: CPT

## 2023-10-04 ENCOUNTER — APPOINTMENT (OUTPATIENT)
Dept: OBGYN | Facility: CLINIC | Age: 23
End: 2023-10-04
Payer: COMMERCIAL

## 2023-10-04 VITALS
SYSTOLIC BLOOD PRESSURE: 107 MMHG | DIASTOLIC BLOOD PRESSURE: 70 MMHG | TEMPERATURE: 98.3 F | HEART RATE: 95 BPM | WEIGHT: 165 LBS | OXYGEN SATURATION: 98 % | BODY MASS INDEX: 25.59 KG/M2 | HEIGHT: 67.5 IN | RESPIRATION RATE: 18 BRPM

## 2023-10-04 PROCEDURE — 0502F SUBSEQUENT PRENATAL CARE: CPT

## 2023-10-05 LAB
BASOPHILS # BLD AUTO: 0.07 K/UL
BASOPHILS NFR BLD AUTO: 0.6 %
BILIRUB UR QL STRIP: NEGATIVE
C TRACH RRNA SPEC QL NAA+PROBE: NOT DETECTED
EOSINOPHIL # BLD AUTO: 0.2 K/UL
EOSINOPHIL NFR BLD AUTO: 1.8 %
GLUCOSE UR-MCNC: NEGATIVE
HCG UR QL: 0.2 EU/DL
HCT VFR BLD CALC: 33.9 %
HGB BLD-MCNC: 10.9 G/DL
HGB UR QL STRIP.AUTO: NORMAL
HIV1+2 AB SPEC QL IA.RAPID: NONREACTIVE
IMM GRANULOCYTES NFR BLD AUTO: 1.9 %
KETONES UR-MCNC: NEGATIVE
LEUKOCYTE ESTERASE UR QL STRIP: NORMAL
LYMPHOCYTES # BLD AUTO: 1.49 K/UL
LYMPHOCYTES NFR BLD AUTO: 13.1 %
MAN DIFF?: NORMAL
MCHC RBC-ENTMCNC: 28.1 PG
MCHC RBC-ENTMCNC: 32.2 GM/DL
MCV RBC AUTO: 87.4 FL
MONOCYTES # BLD AUTO: 0.85 K/UL
MONOCYTES NFR BLD AUTO: 7.5 %
N GONORRHOEA RRNA SPEC QL NAA+PROBE: NOT DETECTED
NEUTROPHILS # BLD AUTO: 8.55 K/UL
NEUTROPHILS NFR BLD AUTO: 75.1 %
NITRITE UR QL STRIP: NEGATIVE
PH UR STRIP: 7
PLATELET # BLD AUTO: 132 K/UL
PROT UR STRIP-MCNC: NEGATIVE
RBC # BLD: 3.88 M/UL
RBC # FLD: 15.2 %
SOURCE AMPLIFICATION: NORMAL
SP GR UR STRIP: 1.02
T PALLIDUM AB SER QL IA: NEGATIVE
WBC # FLD AUTO: 11.38 K/UL

## 2023-10-11 ENCOUNTER — APPOINTMENT (OUTPATIENT)
Dept: OBGYN | Facility: CLINIC | Age: 23
End: 2023-10-11
Payer: COMMERCIAL

## 2023-10-11 VITALS
BODY MASS INDEX: 26.21 KG/M2 | WEIGHT: 167 LBS | HEIGHT: 67 IN | RESPIRATION RATE: 18 BRPM | OXYGEN SATURATION: 97 % | HEART RATE: 78 BPM | SYSTOLIC BLOOD PRESSURE: 116 MMHG | TEMPERATURE: 98 F | DIASTOLIC BLOOD PRESSURE: 72 MMHG

## 2023-10-11 LAB
GP B STREP DNA SPEC QL NAA+PROBE: NOT DETECTED
SOURCE GBS: NORMAL

## 2023-10-11 PROCEDURE — 0502F SUBSEQUENT PRENATAL CARE: CPT

## 2023-10-13 ENCOUNTER — INPATIENT (INPATIENT)
Facility: HOSPITAL | Age: 23
LOS: 2 days | Discharge: HOME | End: 2023-10-16
Attending: STUDENT IN AN ORGANIZED HEALTH CARE EDUCATION/TRAINING PROGRAM | Admitting: STUDENT IN AN ORGANIZED HEALTH CARE EDUCATION/TRAINING PROGRAM
Payer: COMMERCIAL

## 2023-10-13 VITALS — HEIGHT: 67 IN | WEIGHT: 164.91 LBS

## 2023-10-13 DIAGNOSIS — O26.899 OTHER SPECIFIED PREGNANCY RELATED CONDITIONS, UNSPECIFIED TRIMESTER: ICD-10-CM

## 2023-10-13 DIAGNOSIS — Z3A.00 WEEKS OF GESTATION OF PREGNANCY NOT SPECIFIED: ICD-10-CM

## 2023-10-13 DIAGNOSIS — Z34.80 ENCOUNTER FOR SUPERVISION OF OTHER NORMAL PREGNANCY, UNSPECIFIED TRIMESTER: ICD-10-CM

## 2023-10-13 LAB
APTT BLD: 30.3 SEC — SIGNIFICANT CHANGE UP (ref 24.5–35.6)
BASOPHILS # BLD AUTO: 0.08 K/UL — SIGNIFICANT CHANGE UP (ref 0–0.2)
BASOPHILS NFR BLD AUTO: 0.5 % — SIGNIFICANT CHANGE UP (ref 0–2)
EOSINOPHIL # BLD AUTO: 0.19 K/UL — SIGNIFICANT CHANGE UP (ref 0–0.5)
EOSINOPHIL NFR BLD AUTO: 1.3 % — SIGNIFICANT CHANGE UP (ref 0–6)
FIBRINOGEN PPP-MCNC: 478 MG/DL — HIGH (ref 200–475)
HCT VFR BLD CALC: 37 % — SIGNIFICANT CHANGE UP (ref 34.5–45)
HGB BLD-MCNC: 12.2 G/DL — SIGNIFICANT CHANGE UP (ref 11.5–15.5)
IMM GRANULOCYTES NFR BLD AUTO: 1.8 % — HIGH (ref 0–0.9)
INR BLD: 0.85 RATIO — SIGNIFICANT CHANGE UP (ref 0.85–1.18)
LYMPHOCYTES # BLD AUTO: 1.78 K/UL — SIGNIFICANT CHANGE UP (ref 1–3.3)
LYMPHOCYTES # BLD AUTO: 11.8 % — LOW (ref 13–44)
MCHC RBC-ENTMCNC: 28 PG — SIGNIFICANT CHANGE UP (ref 27–34)
MCHC RBC-ENTMCNC: 33 GM/DL — SIGNIFICANT CHANGE UP (ref 32–36)
MCV RBC AUTO: 85.1 FL — SIGNIFICANT CHANGE UP (ref 80–100)
MONOCYTES # BLD AUTO: 1.08 K/UL — HIGH (ref 0–0.9)
MONOCYTES NFR BLD AUTO: 7.2 % — SIGNIFICANT CHANGE UP (ref 2–14)
NEUTROPHILS # BLD AUTO: 11.63 K/UL — HIGH (ref 1.8–7.4)
NEUTROPHILS NFR BLD AUTO: 77.4 % — HIGH (ref 43–77)
NRBC # BLD: 0 /100 WBCS — SIGNIFICANT CHANGE UP (ref 0–0)
PLATELET # BLD AUTO: 145 K/UL — LOW (ref 150–400)
PROTHROM AB SERPL-ACNC: 9.7 SEC — SIGNIFICANT CHANGE UP (ref 9.5–13)
RBC # BLD: 4.35 M/UL — SIGNIFICANT CHANGE UP (ref 3.8–5.2)
RBC # FLD: 14.6 % — HIGH (ref 10.3–14.5)
WBC # BLD: 15.03 K/UL — HIGH (ref 3.8–10.5)
WBC # FLD AUTO: 15.03 K/UL — HIGH (ref 3.8–10.5)

## 2023-10-13 RX ORDER — OXYTOCIN 10 UNIT/ML
333.33 VIAL (ML) INJECTION
Qty: 20 | Refills: 0 | Status: DISCONTINUED | OUTPATIENT
Start: 2023-10-13 | End: 2023-10-13

## 2023-10-13 RX ORDER — OXYTOCIN 10 UNIT/ML
VIAL (ML) INJECTION
Qty: 30 | Refills: 0 | Status: DISCONTINUED | OUTPATIENT
Start: 2023-10-13 | End: 2023-10-14

## 2023-10-13 RX ORDER — SODIUM CHLORIDE 9 MG/ML
1000 INJECTION, SOLUTION INTRAVENOUS
Refills: 0 | Status: DISCONTINUED | OUTPATIENT
Start: 2023-10-13 | End: 2023-10-14

## 2023-10-13 RX ORDER — CITRIC ACID/SODIUM CITRATE 300-500 MG
15 SOLUTION, ORAL ORAL EVERY 6 HOURS
Refills: 0 | Status: DISCONTINUED | OUTPATIENT
Start: 2023-10-13 | End: 2023-10-14

## 2023-10-13 RX ORDER — CHLORHEXIDINE GLUCONATE 213 G/1000ML
1 SOLUTION TOPICAL DAILY
Refills: 0 | Status: DISCONTINUED | OUTPATIENT
Start: 2023-10-13 | End: 2023-10-14

## 2023-10-13 NOTE — PATIENT PROFILE OB - FALL HARM RISK - UNIVERSAL INTERVENTIONS
Bed in lowest position, wheels locked, appropriate side rails in place/Call bell, personal items and telephone in reach/Instruct patient to call for assistance before getting out of bed or chair/Non-slip footwear when patient is out of bed/[place] to call system/Physically safe environment - no spills, clutter or unnecessary equipment/Purposeful Proactive Rounding/Room/bathroom lighting operational, light cord in reach

## 2023-10-14 LAB — T PALLIDUM AB TITR SER: NEGATIVE — SIGNIFICANT CHANGE UP

## 2023-10-14 PROCEDURE — 59409 OBSTETRICAL CARE: CPT | Mod: U8

## 2023-10-14 RX ORDER — OXYCODONE HYDROCHLORIDE 5 MG/1
5 TABLET ORAL EVERY 4 HOURS
Refills: 0 | Status: DISCONTINUED | OUTPATIENT
Start: 2023-10-14 | End: 2023-10-16

## 2023-10-14 RX ORDER — IBUPROFEN 200 MG
600 TABLET ORAL EVERY 6 HOURS
Refills: 0 | Status: COMPLETED | OUTPATIENT
Start: 2023-10-14 | End: 2024-09-11

## 2023-10-14 RX ORDER — BENZOCAINE 10 %
1 GEL (GRAM) MUCOUS MEMBRANE EVERY 6 HOURS
Refills: 0 | Status: DISCONTINUED | OUTPATIENT
Start: 2023-10-14 | End: 2023-10-16

## 2023-10-14 RX ORDER — HYDROCORTISONE 1 %
1 OINTMENT (GRAM) TOPICAL EVERY 6 HOURS
Refills: 0 | Status: DISCONTINUED | OUTPATIENT
Start: 2023-10-14 | End: 2023-10-16

## 2023-10-14 RX ORDER — TETANUS TOXOID, REDUCED DIPHTHERIA TOXOID AND ACELLULAR PERTUSSIS VACCINE, ADSORBED 5; 2.5; 8; 8; 2.5 [IU]/.5ML; [IU]/.5ML; UG/.5ML; UG/.5ML; UG/.5ML
0.5 SUSPENSION INTRAMUSCULAR ONCE
Refills: 0 | Status: DISCONTINUED | OUTPATIENT
Start: 2023-10-14 | End: 2023-10-16

## 2023-10-14 RX ORDER — SODIUM CHLORIDE 9 MG/ML
3 INJECTION INTRAMUSCULAR; INTRAVENOUS; SUBCUTANEOUS EVERY 8 HOURS
Refills: 0 | Status: DISCONTINUED | OUTPATIENT
Start: 2023-10-14 | End: 2023-10-16

## 2023-10-14 RX ORDER — DIPHENHYDRAMINE HCL 50 MG
25 CAPSULE ORAL EVERY 6 HOURS
Refills: 0 | Status: DISCONTINUED | OUTPATIENT
Start: 2023-10-14 | End: 2023-10-16

## 2023-10-14 RX ORDER — OXYTOCIN 10 UNIT/ML
41.67 VIAL (ML) INJECTION
Qty: 20 | Refills: 0 | Status: DISCONTINUED | OUTPATIENT
Start: 2023-10-14 | End: 2023-10-16

## 2023-10-14 RX ORDER — KETOROLAC TROMETHAMINE 30 MG/ML
30 SYRINGE (ML) INJECTION ONCE
Refills: 0 | Status: DISCONTINUED | OUTPATIENT
Start: 2023-10-14 | End: 2023-10-14

## 2023-10-14 RX ORDER — DIBUCAINE 1 %
1 OINTMENT (GRAM) RECTAL EVERY 6 HOURS
Refills: 0 | Status: DISCONTINUED | OUTPATIENT
Start: 2023-10-14 | End: 2023-10-16

## 2023-10-14 RX ORDER — MAGNESIUM HYDROXIDE 400 MG/1
30 TABLET, CHEWABLE ORAL
Refills: 0 | Status: DISCONTINUED | OUTPATIENT
Start: 2023-10-14 | End: 2023-10-16

## 2023-10-14 RX ORDER — LANOLIN
1 OINTMENT (GRAM) TOPICAL EVERY 6 HOURS
Refills: 0 | Status: DISCONTINUED | OUTPATIENT
Start: 2023-10-14 | End: 2023-10-16

## 2023-10-14 RX ORDER — ACETAMINOPHEN 500 MG
975 TABLET ORAL
Refills: 0 | Status: DISCONTINUED | OUTPATIENT
Start: 2023-10-14 | End: 2023-10-16

## 2023-10-14 RX ORDER — AER TRAVELER 0.5 G/1
1 SOLUTION RECTAL; TOPICAL EVERY 4 HOURS
Refills: 0 | Status: DISCONTINUED | OUTPATIENT
Start: 2023-10-14 | End: 2023-10-16

## 2023-10-14 RX ORDER — PRAMOXINE HYDROCHLORIDE 150 MG/15G
1 AEROSOL, FOAM RECTAL EVERY 4 HOURS
Refills: 0 | Status: DISCONTINUED | OUTPATIENT
Start: 2023-10-14 | End: 2023-10-16

## 2023-10-14 RX ORDER — SIMETHICONE 80 MG/1
80 TABLET, CHEWABLE ORAL EVERY 4 HOURS
Refills: 0 | Status: DISCONTINUED | OUTPATIENT
Start: 2023-10-14 | End: 2023-10-16

## 2023-10-14 RX ADMIN — Medication 30 MILLIGRAM(S): at 14:00

## 2023-10-14 RX ADMIN — Medication 30 MILLIGRAM(S): at 13:33

## 2023-10-14 RX ADMIN — Medication 2 MILLIUNIT(S)/MIN: at 06:20

## 2023-10-14 RX ADMIN — Medication 125 MILLIUNIT(S)/MIN: at 10:16

## 2023-10-14 RX ADMIN — SODIUM CHLORIDE 125 MILLILITER(S): 9 INJECTION, SOLUTION INTRAVENOUS at 03:52

## 2023-10-14 RX ADMIN — SODIUM CHLORIDE 3 MILLILITER(S): 9 INJECTION INTRAMUSCULAR; INTRAVENOUS; SUBCUTANEOUS at 13:11

## 2023-10-14 RX ADMIN — Medication 25 MILLIGRAM(S): at 05:50

## 2023-10-14 RX ADMIN — Medication 1 TABLET(S): at 11:08

## 2023-10-14 RX ADMIN — Medication 1 SPRAY(S): at 11:08

## 2023-10-15 ENCOUNTER — TRANSCRIPTION ENCOUNTER (OUTPATIENT)
Age: 23
End: 2023-10-15

## 2023-10-15 LAB
BASOPHILS # BLD AUTO: 0.08 K/UL — SIGNIFICANT CHANGE UP (ref 0–0.2)
BASOPHILS NFR BLD AUTO: 0.4 % — SIGNIFICANT CHANGE UP (ref 0–2)
BILIRUB UR QL STRIP: NEGATIVE
CLARITY UR: CLEAR
COLLECTION METHOD: NORMAL
EOSINOPHIL # BLD AUTO: 0.23 K/UL — SIGNIFICANT CHANGE UP (ref 0–0.5)
EOSINOPHIL NFR BLD AUTO: 1.3 % — SIGNIFICANT CHANGE UP (ref 0–6)
GLUCOSE UR-MCNC: NEGATIVE
HCG UR QL: 0.2 EU/DL
HCT VFR BLD CALC: 31.7 % — LOW (ref 34.5–45)
HGB BLD-MCNC: 10.4 G/DL — LOW (ref 11.5–15.5)
HGB UR QL STRIP.AUTO: NEGATIVE
IMM GRANULOCYTES NFR BLD AUTO: 1 % — HIGH (ref 0–0.9)
KETONES UR-MCNC: NEGATIVE
LEUKOCYTE ESTERASE UR QL STRIP: NORMAL
LYMPHOCYTES # BLD AUTO: 11.9 % — LOW (ref 13–44)
LYMPHOCYTES # BLD AUTO: 2.17 K/UL — SIGNIFICANT CHANGE UP (ref 1–3.3)
MCHC RBC-ENTMCNC: 28.2 PG — SIGNIFICANT CHANGE UP (ref 27–34)
MCHC RBC-ENTMCNC: 32.8 GM/DL — SIGNIFICANT CHANGE UP (ref 32–36)
MCV RBC AUTO: 85.9 FL — SIGNIFICANT CHANGE UP (ref 80–100)
MONOCYTES # BLD AUTO: 1.41 K/UL — HIGH (ref 0–0.9)
MONOCYTES NFR BLD AUTO: 7.7 % — SIGNIFICANT CHANGE UP (ref 2–14)
NEUTROPHILS # BLD AUTO: 14.23 K/UL — HIGH (ref 1.8–7.4)
NEUTROPHILS NFR BLD AUTO: 77.7 % — HIGH (ref 43–77)
NITRITE UR QL STRIP: NEGATIVE
NRBC # BLD: 0 /100 WBCS — SIGNIFICANT CHANGE UP (ref 0–0)
PH UR STRIP: 6.5
PLATELET # BLD AUTO: 129 K/UL — LOW (ref 150–400)
PROT UR STRIP-MCNC: 6.5
RBC # BLD: 3.69 M/UL — LOW (ref 3.8–5.2)
RBC # FLD: 14.6 % — HIGH (ref 10.3–14.5)
SP GR UR STRIP: 1.01
WBC # BLD: 18.71 K/UL — HIGH (ref 3.8–10.5)
WBC # FLD AUTO: 18.71 K/UL — HIGH (ref 3.8–10.5)

## 2023-10-15 RX ORDER — FERROUS SULFATE 325(65) MG
1 TABLET ORAL
Qty: 30 | Refills: 0
Start: 2023-10-15 | End: 2023-11-13

## 2023-10-15 RX ORDER — IBUPROFEN 200 MG
1 TABLET ORAL
Qty: 20 | Refills: 0
Start: 2023-10-15 | End: 2023-10-19

## 2023-10-15 RX ORDER — DOCUSATE SODIUM 100 MG
1 CAPSULE ORAL
Qty: 120 | Refills: 0
Start: 2023-10-15 | End: 2023-11-13

## 2023-10-15 RX ORDER — ACETAMINOPHEN 500 MG
2 TABLET ORAL
Qty: 40 | Refills: 0
Start: 2023-10-15 | End: 2023-10-19

## 2023-10-15 RX ADMIN — SODIUM CHLORIDE 3 MILLILITER(S): 9 INJECTION INTRAMUSCULAR; INTRAVENOUS; SUBCUTANEOUS at 13:51

## 2023-10-15 RX ADMIN — SODIUM CHLORIDE 3 MILLILITER(S): 9 INJECTION INTRAMUSCULAR; INTRAVENOUS; SUBCUTANEOUS at 22:29

## 2023-10-15 RX ADMIN — Medication 975 MILLIGRAM(S): at 22:51

## 2023-10-15 RX ADMIN — Medication 1 TABLET(S): at 13:11

## 2023-10-15 RX ADMIN — SODIUM CHLORIDE 3 MILLILITER(S): 9 INJECTION INTRAMUSCULAR; INTRAVENOUS; SUBCUTANEOUS at 07:06

## 2023-10-15 RX ADMIN — Medication 975 MILLIGRAM(S): at 21:51

## 2023-10-15 NOTE — CHART NOTE - NSCHARTNOTEFT_GEN_A_CORE
met with patient and her baby's father at the bedside at their request.  Family had questions regarding insurance and application for benefits.  Father shared that he will accept responsibility for providing insurance coverage for the baby as mother is in college and is currently under her father's insurance.  Family shared that they have resources for the baby and is in the process of selecting a pediatrician for their baby.  Patient is socially cleared for discharge with her baby.

## 2023-10-15 NOTE — PROGRESS NOTE ADULT - ASSESSMENT
A/P:  PPD#1 s/p  @ 37 weeks 2 days, stable   -Continue pain management, postpartum care  -Encourage OOB and ambulation  -Plan for discharge tomorrow  -d/w Dr. Vega

## 2023-10-15 NOTE — DISCHARGE NOTE OB - PATIENT PORTAL LINK FT
You can access the FollowMyHealth Patient Portal offered by [facility] by registering at the following website: [email or web address]. By joining [facility]’s FollowMyHealth portal, you will also be able to view your health information using other applications (apps) compatible with our system.

## 2023-10-15 NOTE — DISCHARGE NOTE OB - MEDICATION SUMMARY - MEDICATIONS TO TAKE
I will START or STAY ON the medications listed below when I get home from the hospital:    ibuprofen 600 mg oral tablet  -- 1 tab(s) by mouth every 6 hours  -- Indication: For pain    Tylenol Extra Strength 500 mg oral tablet  -- 2 tab(s) by mouth every 6 hours  -- Indication: For pain    Prenatal Plus Iron oral tablet  -- 1 tab(s) by mouth once a day  -- Indication: For Supplementation    ferrous sulfate 325 mg (65 mg elemental iron) oral tablet  -- 1 tab(s) by mouth once a day  -- Indication: For anemia    Colace 100 mg oral capsule  -- 1 cap(s) by mouth every 6 hours  -- Indication: For constipation

## 2023-10-16 VITALS
RESPIRATION RATE: 17 BRPM | SYSTOLIC BLOOD PRESSURE: 115 MMHG | HEART RATE: 67 BPM | DIASTOLIC BLOOD PRESSURE: 70 MMHG | TEMPERATURE: 98 F | OXYGEN SATURATION: 95 %

## 2023-10-16 LAB
BASOPHILS # BLD AUTO: 0.08 K/UL — SIGNIFICANT CHANGE UP (ref 0–0.2)
BASOPHILS NFR BLD AUTO: 0.5 % — SIGNIFICANT CHANGE UP (ref 0–2)
EOSINOPHIL # BLD AUTO: 0.36 K/UL — SIGNIFICANT CHANGE UP (ref 0–0.5)
EOSINOPHIL NFR BLD AUTO: 2.4 % — SIGNIFICANT CHANGE UP (ref 0–6)
HCT VFR BLD CALC: 31.9 % — LOW (ref 34.5–45)
HGB BLD-MCNC: 10.9 G/DL — LOW (ref 11.5–15.5)
IMM GRANULOCYTES NFR BLD AUTO: 1 % — HIGH (ref 0–0.9)
LYMPHOCYTES # BLD AUTO: 15.1 % — SIGNIFICANT CHANGE UP (ref 13–44)
LYMPHOCYTES # BLD AUTO: 2.22 K/UL — SIGNIFICANT CHANGE UP (ref 1–3.3)
MCHC RBC-ENTMCNC: 28.6 PG — SIGNIFICANT CHANGE UP (ref 27–34)
MCHC RBC-ENTMCNC: 34.2 GM/DL — SIGNIFICANT CHANGE UP (ref 32–36)
MCV RBC AUTO: 83.7 FL — SIGNIFICANT CHANGE UP (ref 80–100)
MONOCYTES # BLD AUTO: 1.24 K/UL — HIGH (ref 0–0.9)
MONOCYTES NFR BLD AUTO: 8.4 % — SIGNIFICANT CHANGE UP (ref 2–14)
NEUTROPHILS # BLD AUTO: 10.67 K/UL — HIGH (ref 1.8–7.4)
NEUTROPHILS NFR BLD AUTO: 72.6 % — SIGNIFICANT CHANGE UP (ref 43–77)
NRBC # BLD: 0 /100 WBCS — SIGNIFICANT CHANGE UP (ref 0–0)
PLATELET # BLD AUTO: 149 K/UL — LOW (ref 150–400)
RBC # BLD: 3.81 M/UL — SIGNIFICANT CHANGE UP (ref 3.8–5.2)
RBC # FLD: 14.5 % — SIGNIFICANT CHANGE UP (ref 10.3–14.5)
WBC # BLD: 14.72 K/UL — HIGH (ref 3.8–10.5)
WBC # FLD AUTO: 14.72 K/UL — HIGH (ref 3.8–10.5)

## 2023-10-16 PROCEDURE — 85610 PROTHROMBIN TIME: CPT

## 2023-10-16 PROCEDURE — 86900 BLOOD TYPING SEROLOGIC ABO: CPT

## 2023-10-16 PROCEDURE — 86901 BLOOD TYPING SEROLOGIC RH(D): CPT

## 2023-10-16 PROCEDURE — 86850 RBC ANTIBODY SCREEN: CPT

## 2023-10-16 PROCEDURE — 59050 FETAL MONITOR W/REPORT: CPT

## 2023-10-16 PROCEDURE — 85730 THROMBOPLASTIN TIME PARTIAL: CPT

## 2023-10-16 PROCEDURE — 85025 COMPLETE CBC W/AUTO DIFF WBC: CPT

## 2023-10-16 PROCEDURE — 90707 MMR VACCINE SC: CPT

## 2023-10-16 PROCEDURE — 59025 FETAL NON-STRESS TEST: CPT

## 2023-10-16 PROCEDURE — G0463: CPT

## 2023-10-16 PROCEDURE — 85384 FIBRINOGEN ACTIVITY: CPT

## 2023-10-16 PROCEDURE — 86780 TREPONEMA PALLIDUM: CPT

## 2023-10-16 PROCEDURE — 36415 COLL VENOUS BLD VENIPUNCTURE: CPT

## 2023-10-16 RX ORDER — IBUPROFEN 200 MG
600 TABLET ORAL EVERY 6 HOURS
Refills: 0 | Status: DISCONTINUED | OUTPATIENT
Start: 2023-10-16 | End: 2023-10-16

## 2023-10-16 RX ADMIN — Medication 0.5 MILLILITER(S): at 10:33

## 2023-10-16 RX ADMIN — Medication 600 MILLIGRAM(S): at 06:48

## 2023-10-16 RX ADMIN — Medication 600 MILLIGRAM(S): at 05:46

## 2023-10-16 RX ADMIN — MAGNESIUM HYDROXIDE 30 MILLILITER(S): 400 TABLET, CHEWABLE ORAL at 05:46

## 2023-10-16 RX ADMIN — SODIUM CHLORIDE 3 MILLILITER(S): 9 INJECTION INTRAMUSCULAR; INTRAVENOUS; SUBCUTANEOUS at 05:32

## 2023-10-16 NOTE — LACTATION INITIAL EVALUATION - INTERVENTION OUTCOME
[facility] Tele-lactation program/verbalizes understanding/demonstrates understanding of teaching/needs met/Lactation team to follow up

## 2023-10-16 NOTE — LACTATION INITIAL EVALUATION - LACTATION INTERVENTIONS
Reinforced benefits of  exclusive breastfeeding for first 6 months and provided with breastfeeding community resource list for breastfeeding support/assistance available post-discharge and of importance of early f/u with pediatrician within 2-3 days. Referred to [facility] Tele-lactation program for breastfeeding f/u assessment and support post-discharge; Referral for breastpump sent via pt's insurance per pt's request/initiate/review safe skin-to-skin/initiate/review hand expression/initiate/review techniques for position and latch/review techniques to increase milk supply/review techniques to manage sore nipples/engorgement/reviewed components of an effective feeding and at least 8 effective feedings per day required/reviewed importance of monitoring infant diapers, the breastfeeding log, and minimum output each day/reviewed risks of unnecessary formula supplementation/reviewed risks of artificial nipples/reviewed benefits and recommendations for rooming in/reviewed feeding on demand/by cue at least 8 times a day/reviewed indications of inadequate milk transfer that would require supplementation

## 2023-10-16 NOTE — PROGRESS NOTE ADULT - ASSESSMENT
A/P:  PPD#2 s/p   no comp. Pt is stable    - Discharge home with instructions  - Follow up in office in 5-6 weeks for postpartum visit  - Breastfeeding encouraged   -d/w dr Vega

## 2023-10-16 NOTE — PROGRESS NOTE ADULT - SUBJECTIVE AND OBJECTIVE BOX
OBGYN PA Note  Patient seen at bedside resting comfortably offers no current complaints.  at bedside.  Ambulating and voiding without difficulty.  Passing flatus and tolerating regular diet.  Both breast/bottle feeding.  Denies HA, CP, SOB, N/V/D, dizziness, palpitations, worsening abdominal pain, worsening vaginal bleeding, or any other concerns.      Vital Signs Last 24 Hrs  T(C): 36.7 (16 Oct 2023 06:42), Max: 36.7 (16 Oct 2023 06:42)  T(F): 98 (16 Oct 2023 06:42), Max: 98 (16 Oct 2023 06:42)  HR: 67 (16 Oct 2023 06:42) (67 - 87)  BP: 115/70 (16 Oct 2023 06:42) (115/70 - 123/73)  BP(mean): --  RR: 17 (16 Oct 2023 06:42) (17 - 18)  SpO2: 95% (16 Oct 2023 06:42) (95% - 98%)    Parameters below as of 16 Oct 2023 06:42  Patient On (Oxygen Delivery Method): room air    Physical Exam:     Gen: A&Ox 3, NAD  Chest: CTA B/L  Cardiac: S1,S2; RRR  Breast: Soft, nontender, nonengorged  Abdomen: +BS; Soft, nontender, ND; Fundus firm below umbilicus  Gyn: Min lochia  Ext: Nontender, no worsening edema                          10.9   14.72 )-----------( 149      ( 16 Oct 2023 06:41 )             31.9        
OB PA Progress Note PPD#1    Patient seen at bedside resting comfortably offers no current complaints. Ambulating and voiding without difficulty.  Passing flatus and tolerating regular diet.  both breast/bottle feeding . Denies HA, CP, SOB, N/V/D, dizziness, palpitations, worsening abdominal pain, worsening vaginal bleeding, or any other concerns.      Vital Signs Last 24 Hrs  T(C): 36.4 (15 Oct 2023 05:30), Max: 36.5 (14 Oct 2023 10:26)  T(F): 97.6 (15 Oct 2023 05:30), Max: 97.7 (14 Oct 2023 10:26)  HR: 63 (15 Oct 2023 05:30) (63 - 100)  BP: 116/71 (15 Oct 2023 05:30) (113/54 - 127/71)  BP(mean): --  RR: 16 (15 Oct 2023 05:30) (16 - 18)  SpO2: 98% (15 Oct 2023 05:30) (97% - 98%)    Parameters below as of 15 Oct 2023 05:30  Patient On (Oxygen Delivery Method): room air        Physical Exam:     Gen: A&Ox 3, NAD  Chest: CTA B/L  Cardiac: S1,S2; RRR  Breast: Soft, nontender, nonengorged  Abdomen: +BS, Soft, nontender,  ND; Fundus firm below umbilicus  Gyn: mod lochia, repaired  Ext: Nontender,  no worsening edema                          10.4   18.71 )-----------( 129      ( 15 Oct 2023 06:31 )             31.7

## 2023-10-18 ENCOUNTER — APPOINTMENT (OUTPATIENT)
Dept: OBGYN | Facility: CLINIC | Age: 23
End: 2023-10-18

## 2023-10-19 ENCOUNTER — APPOINTMENT (OUTPATIENT)
Age: 23
End: 2023-10-19
Payer: COMMERCIAL

## 2023-10-19 PROCEDURE — S9443: CPT | Mod: 95

## 2023-11-27 ENCOUNTER — APPOINTMENT (OUTPATIENT)
Dept: OBGYN | Facility: CLINIC | Age: 23
End: 2023-11-27
Payer: COMMERCIAL

## 2023-11-27 VITALS
OXYGEN SATURATION: 98 % | HEIGHT: 67.5 IN | TEMPERATURE: 97.8 F | DIASTOLIC BLOOD PRESSURE: 72 MMHG | BODY MASS INDEX: 23.42 KG/M2 | SYSTOLIC BLOOD PRESSURE: 106 MMHG | WEIGHT: 151 LBS | HEART RATE: 72 BPM | RESPIRATION RATE: 16 BRPM

## 2023-11-27 PROCEDURE — 0503F POSTPARTUM CARE VISIT: CPT

## 2023-12-17 NOTE — HISTORY OF PRESENT ILLNESS
[FreeTextEntry1] : JULIANO DONALD 22 YO presents for Post Partum  G 1  P  1001 -  @ [facility] on 10/14/23  LMP 23    Nursing

## 2023-12-17 NOTE — HISTORY OF PRESENT ILLNESS
[FreeTextEntry1] : JULIANO DONALD 22 YO presents for Post Partum  G 1  P  1001 -  @ [facility] on 10/14/23  LMP 23    Nursing

## 2024-02-20 ENCOUNTER — APPOINTMENT (OUTPATIENT)
Dept: FAMILY MEDICINE | Facility: CLINIC | Age: 24
End: 2024-02-20
Payer: COMMERCIAL

## 2024-02-20 VITALS
HEIGHT: 67 IN | SYSTOLIC BLOOD PRESSURE: 108 MMHG | BODY MASS INDEX: 23.7 KG/M2 | WEIGHT: 151 LBS | OXYGEN SATURATION: 100 % | RESPIRATION RATE: 16 BRPM | DIASTOLIC BLOOD PRESSURE: 66 MMHG | HEART RATE: 104 BPM | TEMPERATURE: 98 F

## 2024-02-20 DIAGNOSIS — R63.4 ABNORMAL WEIGHT LOSS: ICD-10-CM

## 2024-02-20 DIAGNOSIS — R23.8 OTHER SKIN CHANGES: ICD-10-CM

## 2024-02-20 DIAGNOSIS — D64.9 ANEMIA, UNSPECIFIED: ICD-10-CM

## 2024-02-20 DIAGNOSIS — U07.1 COVID-19: ICD-10-CM

## 2024-02-20 DIAGNOSIS — Z02.1 ENCOUNTER FOR PRE-EMPLOYMENT EXAMINATION: ICD-10-CM

## 2024-02-20 DIAGNOSIS — O26.843 UTERINE SIZE-DATE DISCREPANCY, THIRD TRIMESTER: ICD-10-CM

## 2024-02-20 DIAGNOSIS — Z34.93 ENCOUNTER FOR SUPERVISION OF NORMAL PREGNANCY, UNSPECIFIED, THIRD TRIMESTER: ICD-10-CM

## 2024-02-20 DIAGNOSIS — E55.9 VITAMIN D DEFICIENCY, UNSPECIFIED: ICD-10-CM

## 2024-02-20 DIAGNOSIS — Z82.61 FAMILY HISTORY OF ARTHRITIS: ICD-10-CM

## 2024-02-20 DIAGNOSIS — Z00.00 ENCOUNTER FOR GENERAL ADULT MEDICAL EXAMINATION W/OUT ABNORMAL FINDINGS: ICD-10-CM

## 2024-02-20 PROCEDURE — 90686 IIV4 VACC NO PRSV 0.5 ML IM: CPT

## 2024-02-20 PROCEDURE — 99395 PREV VISIT EST AGE 18-39: CPT | Mod: 25

## 2024-02-20 PROCEDURE — G0008: CPT

## 2024-02-20 RX ORDER — TRIAMCINOLONE ACETONIDE 1 MG/G
0.1 OINTMENT TOPICAL
Qty: 1 | Refills: 1 | Status: DISCONTINUED | COMMUNITY
Start: 2021-10-25 | End: 2024-02-20

## 2024-02-20 RX ORDER — TAZAROTENE 1 MG/G
0.1 CREAM TOPICAL
Qty: 1 | Refills: 7 | Status: DISCONTINUED | COMMUNITY
Start: 2022-03-02 | End: 2024-02-20

## 2024-02-20 RX ORDER — VITAMIN K2 90 MCG
125 MCG CAPSULE ORAL
Qty: 1 | Refills: 2 | Status: DISCONTINUED | COMMUNITY
Start: 2023-01-31 | End: 2024-02-20

## 2024-02-20 RX ORDER — CLINDAMYCIN PHOSPHATE 10 MG/ML
1 LOTION TOPICAL
Qty: 1 | Refills: 4 | Status: DISCONTINUED | COMMUNITY
Start: 2019-03-26 | End: 2024-02-20

## 2024-02-20 RX ORDER — PNV NO.95/FERROUS FUM/FOLIC AC 28MG-0.8MG
TABLET ORAL
Refills: 0 | Status: ACTIVE | COMMUNITY

## 2024-02-20 RX ORDER — DOXYLAMINE SUCCINATE AND PYRIDOXINE HYDROCHLORIDE 10; 10 MG/1; MG/1
10-10 TABLET, DELAYED RELEASE ORAL
Qty: 60 | Refills: 3 | Status: DISCONTINUED | COMMUNITY
Start: 2023-03-14 | End: 2024-02-20

## 2024-02-20 RX ORDER — B-COMPLEX WITH VITAMIN C
100 TABLET ORAL DAILY
Qty: 30 | Refills: 2 | Status: DISCONTINUED | COMMUNITY
Start: 2023-01-31 | End: 2024-02-20

## 2024-02-20 RX ORDER — GUAIFENESIN AND DEXTROMETHORPHAN HYDROBROMIDE 600; 30 MG/1; MG/1
30-600 TABLET, EXTENDED RELEASE ORAL
Qty: 10 | Refills: 0 | Status: DISCONTINUED | COMMUNITY
Start: 2023-01-31 | End: 2024-02-20

## 2024-02-20 RX ORDER — MULTIVIT-MIN/IRON/FOLIC ACID/K 18-600-40
500 CAPSULE ORAL
Qty: 60 | Refills: 0 | Status: DISCONTINUED | COMMUNITY
Start: 2023-01-31 | End: 2024-02-20

## 2024-02-20 RX ORDER — TRETINOIN 1 MG/G
0.1 CREAM TOPICAL
Qty: 1 | Refills: 5 | Status: DISCONTINUED | COMMUNITY
Start: 2021-02-22 | End: 2024-02-20

## 2024-02-20 RX ORDER — ERGOCALCIFEROL 1.25 MG/1
1.25 MG CAPSULE, LIQUID FILLED ORAL
Qty: 13 | Refills: 2 | Status: DISCONTINUED | COMMUNITY
Start: 2020-08-28 | End: 2024-02-20

## 2024-02-20 NOTE — HEALTH RISK ASSESSMENT
[No] : In the past 12 months have you used drugs other than those required for medical reasons? No [Little interest or pleasure doing things] : 1) Little interest or pleasure doing things [Feeling down, depressed, or hopeless] : 2) Feeling down, depressed, or hopeless [0] : 2) Feeling down, depressed, or hopeless: Not at all (0) [PHQ-2 Negative - No further assessment needed] : PHQ-2 Negative - No further assessment needed [With Significant Other] : lives with significant other [With Family] : lives with family [Unemployed] : unemployed [] :  [Sexually Active] : sexually active [Never] : Never [Audit-CScore] : 0 [de-identified] : walks [de-identified] : could be better [[id number]] : 0 [High Risk Behavior] : no high risk behavior

## 2024-02-20 NOTE — PHYSICAL EXAM
[No Acute Distress] : no acute distress [Well Nourished] : well nourished [Well Developed] : well developed [Well-Appearing] : well-appearing [Normal Sclera/Conjunctiva] : normal sclera/conjunctiva [PERRL] : pupils equal round and reactive to light [EOMI] : extraocular movements intact [Normal Outer Ear/Nose] : the outer ears and nose were normal in appearance [Normal Oropharynx] : the oropharynx was normal [Normal Nasal Mucosa] : the nasal mucosa was normal [No Lymphadenopathy] : no lymphadenopathy [Supple] : supple [No Respiratory Distress] : no respiratory distress  [No Accessory Muscle Use] : no accessory muscle use [Clear to Auscultation] : lungs were clear to auscultation bilaterally [Normal Rate] : normal rate  [Regular Rhythm] : with a regular rhythm [Normal S1, S2] : normal S1 and S2 [Pedal Pulses Present] : the pedal pulses are present [No Edema] : there was no peripheral edema [Soft] : abdomen soft [Non Tender] : non-tender [Non-distended] : non-distended [No Masses] : no abdominal mass palpated [Normal Bowel Sounds] : normal bowel sounds [Normal Posterior Cervical Nodes] : no posterior cervical lymphadenopathy [Normal Anterior Cervical Nodes] : no anterior cervical lymphadenopathy [No CVA Tenderness] : no CVA  tenderness [No Spinal Tenderness] : no spinal tenderness [No Joint Swelling] : no joint swelling [Grossly Normal Strength/Tone] : grossly normal strength/tone [Coordination Grossly Intact] : coordination grossly intact [Normal Gait] : normal gait [Normal Affect] : the affect was normal [Normal Insight/Judgement] : insight and judgment were intact

## 2024-02-20 NOTE — REVIEW OF SYSTEMS
[Fever] : no fever [Chills] : no chills [Pain] : no pain [Vision Problems] : no vision problems [Nasal Discharge] : no nasal discharge [Sore Throat] : no sore throat [Chest Pain] : no chest pain [Palpitations] : no palpitations [Lower Ext Edema] : no lower extremity edema [Shortness Of Breath] : no shortness of breath [Cough] : no cough [Dyspnea on Exertion] : no dyspnea on exertion [Abdominal Pain] : no abdominal pain [Constipation] : no constipation [Diarrhea] : diarrhea [Vomiting] : no vomiting [Melena] : no melena [Dysuria] : no dysuria [Hematuria] : no hematuria [Frequency] : no frequency [Muscle Weakness] : no muscle weakness [Muscle Pain] : no muscle pain [Itching] : no itching [Skin Rash] : no skin rash [Headache] : no headache [Dizziness] : no dizziness [Insomnia] : no insomnia [Depression] : no depression [Easy Bleeding] : no easy bleeding [Easy Bruising] : no easy bruising

## 2024-02-20 NOTE — HISTORY OF PRESENT ILLNESS
[FreeTextEntry1] : New CPE [de-identified] : The patient is a 22yo female with pmhx of anemia who presents today for a CPE. Feels well today  Vaccinations: due for Flu shot, covid19 x2, Tdap 2/20/2019  Last Papsmear: 3/12/23; LMP 2/9/24  UTD with routine dental and eye exams

## 2024-02-20 NOTE — PLAN
[FreeTextEntry1] : 1. CPE - routine labs ordered (CBC, CMP, Lipid panel, TSH, A1c) - UTD with papsmear - Influenza vaccination administered to left deltoid - tolerated well - UTD with Tdap - MMR titers, hep B surface ab and quantiferon gold ordered fro pre-employment physical -- form to be completed after labs result - Depression screen with PHQ2 negative - UTD with routine dental and eye exams  2, Anemia - hx of anemia during pregnancy - CBC, iron level, ferritin, B12, and folate ordered - If anemia persistent will also order FOBI

## 2024-02-23 LAB
25(OH)D3 SERPL-MCNC: 22.3 NG/ML
ALBUMIN SERPL ELPH-MCNC: 4.6 G/DL
ALP BLD-CCNC: 73 U/L
ALT SERPL-CCNC: 14 U/L
AMPHET UR-MCNC: NEGATIVE NG/ML
ANION GAP SERPL CALC-SCNC: 9 MMOL/L
AST SERPL-CCNC: 12 U/L
BARBITURATES UR-MCNC: NEGATIVE NG/ML
BENZODIAZ UR-MCNC: NEGATIVE NG/ML
BILIRUB SERPL-MCNC: 0.2 MG/DL
BUN SERPL-MCNC: 14 MG/DL
CALCIUM SERPL-MCNC: 9.3 MG/DL
CHLORIDE SERPL-SCNC: 104 MMOL/L
CHOLEST SERPL-MCNC: 196 MG/DL
CO2 SERPL-SCNC: 26 MMOL/L
COCAINE METAB.OTHER UR-MCNC: NEGATIVE NG/ML
CREAT SERPL-MCNC: 0.6 MG/DL
CREATININE, URINE: 237.7 MG/DL
EGFR: 128 ML/MIN/1.73M2
ESTIMATED AVERAGE GLUCOSE: 100 MG/DL
FENTANYL, URINE: NEGATIVE NG/ML
FERRITIN SERPL-MCNC: 58 NG/ML
FOLATE SERPL-MCNC: 14.3 NG/ML
GLUCOSE SERPL-MCNC: 86 MG/DL
HBA1C MFR BLD HPLC: 5.1 %
HBV SURFACE AB SERPL IA-ACNC: 4 MIU/ML
HCT VFR BLD CALC: 37.5 %
HDLC SERPL-MCNC: 51 MG/DL
HGB BLD-MCNC: 12.2 G/DL
IRON SATN MFR SERPL: 15 %
IRON SERPL-MCNC: 39 UG/DL
LDLC SERPL CALC-MCNC: 128 MG/DL
M TB IFN-G BLD-IMP: NEGATIVE
MCHC RBC-ENTMCNC: 27.4 PG
MCHC RBC-ENTMCNC: 32.5 GM/DL
MCV RBC AUTO: 84.3 FL
METHADONE UR-MCNC: NEGATIVE NG/ML
MEV IGG FLD QL IA: 154 AU/ML
MEV IGG+IGM SER-IMP: POSITIVE
MUV AB SER-ACNC: POSITIVE
MUV IGG SER QL IA: 190 AU/ML
NONHDLC SERPL-MCNC: 145 MG/DL
OPIATES UR-MCNC: NEGATIVE NG/ML
OXYCODONE/OXYMORPHONE, URINE: NEGATIVE NG/ML
PCP UR-MCNC: NEGATIVE NG/ML
PH, URINE: 5.5
PLATELET # BLD AUTO: 157 K/UL
PLEASE NOTE: DRUGSCRUR: NORMAL
POTASSIUM SERPL-SCNC: 4.2 MMOL/L
PROT SERPL-MCNC: 8.1 G/DL
QUANTIFERON TB PLUS MITOGEN MINUS NIL: >10 IU/ML
QUANTIFERON TB PLUS NIL: 0.02 IU/ML
QUANTIFERON TB PLUS TB1 MINUS NIL: 0 IU/ML
QUANTIFERON TB PLUS TB2 MINUS NIL: -0.01 IU/ML
RBC # BLD: 4.45 M/UL
RBC # FLD: 12.7 %
RUBV IGG FLD-ACNC: 5.5 INDEX
RUBV IGG SER-IMP: POSITIVE
SODIUM SERPL-SCNC: 139 MMOL/L
THC UR QL: NEGATIVE NG/ML
TIBC SERPL-MCNC: 260 UG/DL
TRIGL SERPL-MCNC: 92 MG/DL
TSH SERPL-ACNC: 0.99 UIU/ML
UIBC SERPL-MCNC: 221 UG/DL
VIT B12 SERPL-MCNC: 367 PG/ML
WBC # FLD AUTO: 6.18 K/UL

## 2024-06-19 ENCOUNTER — APPOINTMENT (OUTPATIENT)
Dept: OBGYN | Facility: CLINIC | Age: 24
End: 2024-06-19

## 2024-06-19 VITALS
DIASTOLIC BLOOD PRESSURE: 74 MMHG | HEART RATE: 100 BPM | OXYGEN SATURATION: 99 % | HEIGHT: 67 IN | TEMPERATURE: 98 F | BODY MASS INDEX: 23.23 KG/M2 | SYSTOLIC BLOOD PRESSURE: 112 MMHG | WEIGHT: 148 LBS | RESPIRATION RATE: 16 BRPM

## 2024-06-19 DIAGNOSIS — N89.8 OTHER SPECIFIED NONINFLAMMATORY DISORDERS OF VAGINA: ICD-10-CM

## 2024-06-19 DIAGNOSIS — Z01.419 ENCOUNTER FOR GYNECOLOGICAL EXAMINATION (GENERAL) (ROUTINE) W/OUT ABNORMAL FINDINGS: ICD-10-CM

## 2024-06-19 PROCEDURE — 99395 PREV VISIT EST AGE 18-39: CPT

## 2024-06-19 PROCEDURE — 99459 PELVIC EXAMINATION: CPT

## 2024-06-19 RX ORDER — FLUCONAZOLE 150 MG/1
150 TABLET ORAL DAILY
Qty: 1 | Refills: 1 | Status: ACTIVE | COMMUNITY
Start: 2024-06-19 | End: 1900-01-01

## 2024-06-19 RX ORDER — CLOTRIMAZOLE 10 MG/G
1 CREAM VAGINAL
Qty: 1 | Refills: 0 | Status: ACTIVE | COMMUNITY
Start: 2024-06-19 | End: 1900-01-01

## 2024-06-19 NOTE — HISTORY OF PRESENT ILLNESS
[FreeTextEntry1] : JULIANO DONALD ,23 YO, Presents for Annual and Vaginal Irritation. PMHX:Denies PSHX:Denies GYNHX :( Denies) Non Smoker LMP: Regular Menses No Medication Sexually active using condoms. Last pap was on 2023 No Family History of breast cancer. States that  have 4 days with a strong odor and clear discharge.

## 2024-06-24 LAB — CYTOLOGY CVX/VAG DOC THIN PREP: NORMAL

## 2024-10-26 NOTE — DISCHARGE NOTE OB - CARE PROVIDER_API CALL
Patient noted with markedly elevated TSH up to 26 on 10/15  - FT4 noted to be WNL at 1.0  - currently alert and conversant, without gross lethargy/bradycardia or symptoms of hypothyroidism  - suspect that patient may have some degree of subclinical hypothyroidism  - would recommend outpatient follow up in 2-3 weeks for repeat thyroid function testing Opal Del Valle  Obstetrics and Gynecology  [address], [address]  [place] [address]  Phone: [phone]  Fax: [phone]  Follow Up Time: 1 month

## 2024-10-28 DIAGNOSIS — L70.0 ACNE VULGARIS: ICD-10-CM

## 2025-04-20 ENCOUNTER — EMERGENCY (EMERGENCY)
Facility: HOSPITAL | Age: 25
LOS: 1 days | End: 2025-04-20
Attending: STUDENT IN AN ORGANIZED HEALTH CARE EDUCATION/TRAINING PROGRAM | Admitting: STUDENT IN AN ORGANIZED HEALTH CARE EDUCATION/TRAINING PROGRAM
Payer: COMMERCIAL

## 2025-04-20 VITALS
OXYGEN SATURATION: 95 % | DIASTOLIC BLOOD PRESSURE: 71 MMHG | TEMPERATURE: 98 F | WEIGHT: 139.99 LBS | SYSTOLIC BLOOD PRESSURE: 111 MMHG | HEART RATE: 78 BPM | HEIGHT: 68 IN | RESPIRATION RATE: 16 BRPM

## 2025-04-20 PROCEDURE — 99284 EMERGENCY DEPT VISIT MOD MDM: CPT

## 2025-04-20 PROCEDURE — 73630 X-RAY EXAM OF FOOT: CPT | Mod: 26,LT

## 2025-04-20 NOTE — ED PROVIDER NOTE - CARE PLAN
Virtual Regular Visit      Assessment/Plan:    Problem List Items Addressed This Visit        Cardiovascular and Mediastinum    Chronic migraine without aura without status migrainosus, not intractable - Primary      Preventive:   Continue doing magnesium oxide 400mg in am daily  Wean off the Venlafaxine  37 5 mg for 14 days then stop  Start Depakote 250 mg at bedtime for 5 nights then increase to 2 tabs (500 mg) at bedtime   Abortive: At the onset of mild headache patient's take Aleve  At onset of migraine, patient is to take Nurtec 75 mg  Limit of 1 in 24 hours  May use prochlorperazine 10 mg in addition  We will do a CT of the head to ensure no hidden pathology         Relevant Medications    venlafaxine (EFFEXOR-XR) 37 5 mg 24 hr capsule    divalproex sodium (DEPAKOTE ER) 250 mg 24 hr tablet      Other Visit Diagnoses     Injury of head, subsequent encounter        Relevant Orders    CT head wo contrast    Mental confusion        Relevant Medications    venlafaxine (EFFEXOR-XR) 37 5 mg 24 hr capsule    Other Relevant Orders    CT head wo contrast               Reason for visit is   Chief Complaint   Patient presents with    Migraine    Virtual Regular Visit        Encounter provider Elkin Norris PA-C    Provider located at [address] [place] [id number]      Recent Visits  No visits were found meeting these conditions  Showing recent visits within past 7 days and meeting all other requirements     Today's Visits  Date Type Provider Dept   08/11/20 Telemedicine Elkin Norris PA-C  Neuro [facility]   Showing today's visits and meeting all other requirements     Future Appointments  No visits were found meeting these conditions  Showing future appointments within next 150 days and meeting all other requirements        The patient was identified by name and date of birth   Lanre Fuentes was informed that this is a telemedicine visit and that the visit is being conducted through telephone  My office door was closed  No one else was in the room  She acknowledged consent and understanding of privacy and security of the video platform  The patient has agreed to participate and understands they can discontinue the visit at any time  Patient is aware this is a billable service  Subjective  Lanre Gao Daughters is a 47 y  o right handed female 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    [employer] on 12/31/2019 and is now 7 months old  Doug Lesser continue to have intermittent insomnia    gets home around 1 am and dog wakes up   Sometimes has difficulty falling asleep after this   Gets up around 5 am   Patient states that she feels she is getting worse  She states has been having balance issues and mental fogginess  States that she couldn't remember her cat's name at the end of July  When gets off balance she feels she is swaying backwards    In addition she states that she feels jittery on the inside      QTc 6/2/2017 416 ms     Balance problem:  May of 2017 she states she woke up with balance issues and was falling to the left  She states she could not catch her balance and this lasted for 10 minutes  Did have a work up done MRI head ad MRA head and neck which was negative           Migraine headaches without aura: What medications do you take or have you taken for your headaches?    PREVENTIVE:   magnesium oxide 400 mg   Topamax (tingling and mental fogginess)  Venlafaxine  Cyproheptadine (weight gain)  Emgality 8/2020     ABORTIVE:  motrin, tylenol, ketorolac, aleve  Decadron  olanzapine  rizatriptan  Non-Medical/Alternative Treatments used in the past for headaches: Chiropractic adjustment  Headache are worse if the patient:  no  Headache trigger: menstruation     Any warning prior to headache and how long do they last -  none      What is your current pain level - 2/10     How often do the headaches occur -   2018: March: 3 headaches, April: 4 headaches, May: 13 headache (decadron and olanzapine given),  - none, July-  headaches, Au headache, September: 3 headaches, October: one (needed message), November: 5 headaches, December: 7 headache (one headaches lasted for 4 day)  2019: none, Feb: two mild headaches 4-5 and took aleve  May and  almost daily  July has been better-last rizatriptan was end of [place]  August 5  Sept 4  Oct 0  Nov 0 so far  2020-3  Feb 2-3   Mar 5-6  Apr 4  May 6  Ed[place] 7+ (might forget to write down if at work)    (might forget to write down if at work)  Aug so far       What time of the day do the headaches start - usually when she wakes up in the middle of the night or in the morning  How long do the headaches last -  Would last all day  Are you ever headache free - yes  Where are they located - frontal   What is the intensity of pain - 5-6/10  Describe your usual headache - Throbbing, Pressure, aching, dull  Associated symptoms:   · Decrease of appetite, nausea   · Photophobia, phonophobia  · Right ptosis--new  -    light-headed or dizzy         Feels off balanced  · prefer to be alone and in a dark room, unable to work     Number of days missed per month because of headaches:  Work (or school) days: 0  Social or Family activities: 0      What time of the year do headaches occur more frequently?   no  Have you seen someone else for headaches or pain? No  Have you had trigger point injection performed and how often? No  Have you had Botox injection performed and how often? No   Have you had epidural injections or transforaminal injections performed? No     Have you used CBD or THC for your headaches and how often? No  Are you current pregnant or planning on getting pregnant? No, post-menopausal  Have you ever had any Brain imaging? yes      6/3/2017 MRI brain  Normal unenhanced MRI of the brain   Small bilateral mastoid effusions, left side greater than right      6/3/2017 MRA head  Congenital variants   Otherwise, intact [place]      6/3/2017 MRA carotids  Minimal atherosclerotic disease involving the proximal left internal carotid artery   No evidence of flow-limiting stenosis   No evidence of aneurysm, vascular malformation or vascular cut off      I personally reviewed these images      Reviewed old notes from physician seen in the past- see above HPI for summary of previous encounters  Past Medical History:   Diagnosis Date    Abnormal TSH 10/18/2018    Ataxia     Blood in urine     GERD (gastroesophageal reflux disease)     Irritable bowel syndrome     Mild cervical dysplasia     Pituitary adenoma ([address] )     Pregnancy     1     Thyroiditis 10/31/2018    Uterine leiomyoma        Past Surgical History:   Procedure Laterality Date    ABDOMINAL SURGERY      CHOLECYSTECTOMY      COLONOSCOPY      COLPOSCOPY      onset: 2/4/08    ESOPHAGOGASTRODUODENOSCOPY      LAPAROSCOPY      KS COLONOSCOPY FLX DX W/COLLJ SPEC WHEN PFRMD N/A 2/16/2018    Procedure: COLONOSCOPY;  Surgeon: Humble Boudreaux DO;  Location: [facility];  Service: Gastroenterology    KS ESOPHAGOGASTRODUODENOSCOPY TRANSORAL DIAGNOSTIC N/A 3/19/2019    Procedure: ESOPHAGOGASTRODUODENOSCOPY (EGD);   Surgeon: Belle Parnell MD;  Location: [facility];  Service: Gastroenterology    TUBAL LIGATION         Current Outpatient Medications   Medication Sig Dispense Refill    Ascorbic Acid (VITAMIN C) 1000 MG tablet Take 1,000 mg by mouth daily      Cholecalciferol (VITAMIN D3) 32132 units CAPS Take 1 capsule (50,000 Units total) by mouth once a week 12 capsule 3    dexlansoprazole (Dexilant) 60 MG capsule TAKE ONE CAPSULE BY MOUTH DAILY 90 capsule 0    diphenhydrAMINE (BENADRYL) 25 mg capsule Take 50 mg by mouth as needed (migraines)       Galcanezumab-gnlm 120 MG/ML SOAJ Inject 120 mg under the skin every 30 (thirty) days 1 pen 11    ibuprofen (MOTRIN) 200 mg tablet Take 600 mg by mouth as needed for mild pain      ketorolac (TORADOL) 10 mg tablet 1 tabs at onset of migraine, t i d  P r n  Take with food/milk/antacid  (Patient taking differently: Take 10 mg by mouth as needed 1 tabs at onset of migraine, t i d  P r n  Take with food/milk/antacid ) 10 tablet 0    levothyroxine 50 mcg tablet Take 1 tablet (50 mcg total) by mouth daily 90 tablet 3    Magnesium 400 MG CAPS Take 400 mg by mouth daily      Naproxen Sodium (ALEVE) 220 MG CAPS Take 2 capsules by mouth as needed      Omega-3 Fatty Acids (FISH OIL) 1,000 mg Take 1,000 mg by mouth daily      ondansetron (ZOFRAN-ODT) 8 mg disintegrating tablet Take 1 tablet (8 mg total) by mouth every 8 (eight) hours as needed for nausea or vomiting 20 tablet 0    Probiotic Product (ALIGN) 4 MG CAPS Take 1 capsule (4 mg total) by mouth daily 30 capsule 0    Rimegepant Sulfate (Nurtec) 75 MG TBDP Take 75 mg by mouth as needed (migraine) 8 tablet 3    rizatriptan (MAXALT) 10 MG tablet Take 1 tablet (10 mg total) by mouth once as needed for migraine May repeat in 2 hours if needed  Max 2/24 hours, 3/week 9 tablet 0    venlafaxine (EFFEXOR-XR) 75 mg 24 hr capsule TAKE ONE CAPSULE BY MOUTH EVERY DAY 90 capsule 0    dicyclomine (BENTYL) 20 mg tablet Take 1 tablet (20 mg total) by mouth every 6 (six) hours (Patient not taking: Reported on 8/11/2020) 60 tablet 2    divalproex sodium (DEPAKOTE ER) 250 mg 24 hr tablet Take 2 tablets (500 mg total) by mouth daily 60 tablet 5    nystatin (MYCOSTATIN) powder Apply topically 3 (three) times a day (Patient not taking: Reported on 8/11/2020) 60 g 0    venlafaxine (EFFEXOR-XR) 37 5 mg 24 hr capsule Take 1 capsule (37 5 mg total) by mouth daily 14 capsule 0     No current facility-administered medications for this visit           Allergies   Allergen Reactions    Sulfamethoxazole-Trimethoprim Other (See Comments)     lightheaded      I have reviewed the patient's medical, social and surgical history as well as medications in detail and updated the computerized patient record  Review of Systems  Constitutional: Negative  HENT: Positive for tinnitus (right ear )  Eyes: Negative  Respiratory: Negative  Cardiovascular: Negative  Gastrointestinal: Negative  Endocrine: Negative  Genitourinary: Negative  Musculoskeletal: Negative  Skin: Negative  Allergic/Immunologic: Negative  Neurological: Positive for dizziness, light-headedness and headaches (daily headache )  Hematological: Negative  Psychiatric/Behavioral: Positive for decreased concentration (brain fog ) and sleep disturbance (difficulty sleeping )  I personally reviewed and updated the ROS that was entered by the medical assistant    Video Exam    Vitals:    08/11/20 0810   Weight: 77 1 kg (170 lb)   Height: 5' 6" (1 676 m)       Physical Exam   Unable to perform  I spent 25 minutes with patient today in which greater than 50% of the time was spent in counseling/coordination of care regarding as above      [address] acknowledges that she has consented to an online visit or consultation  She understands that the online visit is based solely on information provided by her, and that, in the absence of a face-to-face physical evaluation by the physician, the diagnosis she receives is both limited and provisional in terms of accuracy and completeness  This is not intended to replace a full medical face-to-face evaluation by the physician  Lanre Crain understands and accepts these terms  Principal Discharge DX:	Injury of left foot   1

## 2025-04-20 NOTE — ED PROVIDER NOTE - PATIENT PORTAL LINK FT
You can access the FollowMyHealth Patient Portal offered by [facility] by registering at the following website: [email or web address]. By joining [facility]’s FollowMyHealth portal, you will also be able to view your health information using other applications (apps) compatible with our system.
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
## 2025-04-20 NOTE — ED PROVIDER NOTE - NSFOLLOWUPINSTRUCTIONS_ED_ALL_ED_FT
## Discharge Instructions: Foot Pain    **Symptoms:** You presented to the ED today with foot pain after a car ran over your foot.    **Treatment/Recommendations:**    * **Rest:** Avoid activities that aggravate your foot pain. Limit weight-bearing as much as possible for the first 24-48 hours. Use crutches or a wheelchair if necessary. Gradually increase activity as tolerated.  * **Ice:** Apply ice packs to your foot for 15-20 minutes at a time, every 2-3 hours, for the first 24-48 hours. Place a thin towel between the ice pack and your skin to prevent frostbite.  * **Compression:** Use a compression bandage (e.g., ACE wrap) around your foot and ankle to help reduce swelling. Make sure it's snug but not too tight that it restricts blood flow. You should be able to comfortably slip a finger underneath the bandage.  * **Elevation:** Keep your foot elevated above the level of your heart as much as possible, especially for the first 24-48 hours. This will help reduce swelling.  * **Medication:** You may take over-the-counter pain relievers such as ibuprofen (Advil, Motrin) or naproxen (Aleve) as directed on the package labeling. Take with food to minimize stomach upset. If you have any allergies to these medications or other NSAIDs, please discuss alternative pain management with your doctor.    **Return to the ER if:**    * Your pain significantly worsens.  * You experience numbness or tingling in your toes.  * You have difficulty walking or bearing weight on your foot.  * Your foot becomes increasingly swollen, red, or discolored.  * You develop a fever.  * You notice any open wounds or drainage from your foot.
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
## 2025-04-20 NOTE — ED PROVIDER NOTE - PHYSICAL EXAMINATION
Physical Exam  GEN: Alert and oriented x 3, in no acute distress, speaking full clear sentences  HEENT: NC/AT, PERRL, EOMI, normal oropharynx  EXTR: FROM to all extremities.   Left foot mildly tender over 4th and 5th distal metatarsal and 4th and 5th toes, nontender to remainder of foot.  Nontender to midfoot or calcaneus.  Nontender to ankle and above.  2+  DPP and PTP, SI LT, 5/5 dorsiflexion and plantarflexion  SKIN: Warm, dry, no rash  NEURO: AOx3, speaking full clear sentences, CALVIN 5/5 strength, ambulating with mildly antalgic gait

## 2025-04-20 NOTE — ED ADULT TRIAGE NOTE - ADDITIONAL SAFETY/BANDS...
From: Anabelle Robb  To: KARLI Conway  Sent: 5/3/2021 1:01 PM CDT  Subject: Medication Question    Hi Keyla, hope this finds you well and hoping less stress from covid 19. I stopped at [facility] today and updated my insurance with my medicare advantage information. Please send again the estring request.    Thanks  Maribel   Additional Safety/Bands:

## 2025-04-20 NOTE — ED ADULT TRIAGE NOTE - TEMP AT ED ARRIVAL (C)
36.8

## 2025-05-22 ENCOUNTER — APPOINTMENT (OUTPATIENT)
Dept: FAMILY MEDICINE | Facility: CLINIC | Age: 25
End: 2025-05-22
Payer: COMMERCIAL

## 2025-05-22 VITALS
BODY MASS INDEX: 22.13 KG/M2 | TEMPERATURE: 98.3 F | HEART RATE: 81 BPM | HEIGHT: 67 IN | WEIGHT: 141 LBS | OXYGEN SATURATION: 96 % | SYSTOLIC BLOOD PRESSURE: 101 MMHG | DIASTOLIC BLOOD PRESSURE: 69 MMHG

## 2025-05-22 DIAGNOSIS — L24.89 IRRITANT CONTACT DERMATITIS DUE TO OTHER AGENTS: ICD-10-CM

## 2025-05-22 DIAGNOSIS — Z00.00 ENCOUNTER FOR GENERAL ADULT MEDICAL EXAMINATION W/OUT ABNORMAL FINDINGS: ICD-10-CM

## 2025-05-22 DIAGNOSIS — Z87.42 PERSONAL HISTORY OF OTHER DISEASES OF THE FEMALE GENITAL TRACT: ICD-10-CM

## 2025-05-22 DIAGNOSIS — Z02.1 ENCOUNTER FOR PRE-EMPLOYMENT EXAMINATION: ICD-10-CM

## 2025-05-22 DIAGNOSIS — Z23 ENCOUNTER FOR IMMUNIZATION: ICD-10-CM

## 2025-05-22 DIAGNOSIS — O99.019 ANEMIA COMPLICATING PREGNANCY, UNSPECIFIED TRIMESTER: ICD-10-CM

## 2025-05-22 DIAGNOSIS — E78.5 HYPERLIPIDEMIA, UNSPECIFIED: ICD-10-CM

## 2025-05-22 DIAGNOSIS — Z86.2 PERSONAL HISTORY OF DISEASES OF THE BLOOD AND BLOOD-FORMING ORGANS AND CERTAIN DISORDERS INVOLVING THE IMMUNE MECHANISM: ICD-10-CM

## 2025-05-22 PROCEDURE — 36415 COLL VENOUS BLD VENIPUNCTURE: CPT

## 2025-05-22 PROCEDURE — 99395 PREV VISIT EST AGE 18-39: CPT

## 2025-05-22 RX ORDER — TRETINOIN 0.25 MG/G
0.03 CREAM TOPICAL
Refills: 0 | Status: ACTIVE | COMMUNITY

## 2025-05-23 ENCOUNTER — NON-APPOINTMENT (OUTPATIENT)
Age: 25
End: 2025-05-23

## 2025-05-23 LAB
ALBUMIN SERPL ELPH-MCNC: 4.6 G/DL
ALP BLD-CCNC: 66 U/L
ALT SERPL-CCNC: 19 U/L
ANION GAP SERPL CALC-SCNC: 13 MMOL/L
AST SERPL-CCNC: 27 U/L
BILIRUB SERPL-MCNC: 0.3 MG/DL
BUN SERPL-MCNC: 11 MG/DL
CALCIUM SERPL-MCNC: 9.5 MG/DL
CHLORIDE SERPL-SCNC: 103 MMOL/L
CHOLEST SERPL-MCNC: 175 MG/DL
CO2 SERPL-SCNC: 23 MMOL/L
CREAT SERPL-MCNC: 0.7 MG/DL
EGFRCR SERPLBLD CKD-EPI 2021: 123 ML/MIN/1.73M2
ESTIMATED AVERAGE GLUCOSE: 103 MG/DL
GLUCOSE SERPL-MCNC: 84 MG/DL
HBA1C MFR BLD HPLC: 5.2 %
HCT VFR BLD CALC: 39.2 %
HDLC SERPL-MCNC: 48 MG/DL
HGB BLD-MCNC: 12.2 G/DL
LDLC SERPL-MCNC: 115 MG/DL
MCHC RBC-ENTMCNC: 27.1 PG
MCHC RBC-ENTMCNC: 31.1 G/DL
MCV RBC AUTO: 87.1 FL
NONHDLC SERPL-MCNC: 126 MG/DL
PLATELET # BLD AUTO: 151 K/UL
POTASSIUM SERPL-SCNC: 4.4 MMOL/L
PROT SERPL-MCNC: 7.9 G/DL
RBC # BLD: 4.5 M/UL
RBC # FLD: 13.4 %
SODIUM SERPL-SCNC: 138 MMOL/L
TRIGL SERPL-MCNC: 59 MG/DL
TSH SERPL-ACNC: 1.11 UIU/ML
WBC # FLD AUTO: 4.81 K/UL

## 2025-06-20 ENCOUNTER — EMERGENCY (EMERGENCY)
Facility: HOSPITAL | Age: 25
LOS: 1 days | End: 2025-06-20
Admitting: STUDENT IN AN ORGANIZED HEALTH CARE EDUCATION/TRAINING PROGRAM
Payer: SELF-PAY

## 2025-06-20 ENCOUNTER — NON-APPOINTMENT (OUTPATIENT)
Age: 25
End: 2025-06-20

## 2025-06-20 VITALS
HEART RATE: 115 BPM | OXYGEN SATURATION: 97 % | TEMPERATURE: 98 F | RESPIRATION RATE: 18 BRPM | HEIGHT: 67 IN | WEIGHT: 139.99 LBS | SYSTOLIC BLOOD PRESSURE: 111 MMHG | DIASTOLIC BLOOD PRESSURE: 70 MMHG

## 2025-06-20 VITALS
HEART RATE: 82 BPM | DIASTOLIC BLOOD PRESSURE: 54 MMHG | RESPIRATION RATE: 17 BRPM | OXYGEN SATURATION: 95 % | TEMPERATURE: 98 F | SYSTOLIC BLOOD PRESSURE: 108 MMHG

## 2025-06-20 LAB
ALBUMIN SERPL ELPH-MCNC: 4.3 G/DL — SIGNIFICANT CHANGE UP (ref 3.3–5)
ALP SERPL-CCNC: 72 U/L — SIGNIFICANT CHANGE UP (ref 40–120)
ALT FLD-CCNC: 12 U/L — SIGNIFICANT CHANGE UP (ref 4–33)
ANION GAP SERPL CALC-SCNC: 13 MMOL/L — SIGNIFICANT CHANGE UP (ref 7–14)
AST SERPL-CCNC: 15 U/L — SIGNIFICANT CHANGE UP (ref 4–32)
BASOPHILS # BLD AUTO: 0.03 K/UL — SIGNIFICANT CHANGE UP (ref 0–0.2)
BASOPHILS NFR BLD AUTO: 0.3 % — SIGNIFICANT CHANGE UP (ref 0–2)
BILIRUB SERPL-MCNC: 0.4 MG/DL — SIGNIFICANT CHANGE UP (ref 0.2–1.2)
BUN SERPL-MCNC: 8 MG/DL — SIGNIFICANT CHANGE UP (ref 7–23)
CALCIUM SERPL-MCNC: 10 MG/DL — SIGNIFICANT CHANGE UP (ref 8.4–10.5)
CHLORIDE SERPL-SCNC: 100 MMOL/L — SIGNIFICANT CHANGE UP (ref 98–107)
CO2 SERPL-SCNC: 22 MMOL/L — SIGNIFICANT CHANGE UP (ref 22–31)
CREAT SERPL-MCNC: 0.56 MG/DL — SIGNIFICANT CHANGE UP (ref 0.5–1.3)
EGFR: 129 ML/MIN/1.73M2 — SIGNIFICANT CHANGE UP
EGFR: 129 ML/MIN/1.73M2 — SIGNIFICANT CHANGE UP
EOSINOPHIL # BLD AUTO: 0.17 K/UL — SIGNIFICANT CHANGE UP (ref 0–0.5)
EOSINOPHIL NFR BLD AUTO: 1.5 % — SIGNIFICANT CHANGE UP (ref 0–6)
GLUCOSE SERPL-MCNC: 93 MG/DL — SIGNIFICANT CHANGE UP (ref 70–99)
HCT VFR BLD CALC: 36.9 % — SIGNIFICANT CHANGE UP (ref 34.5–45)
HGB BLD-MCNC: 12.1 G/DL — SIGNIFICANT CHANGE UP (ref 11.5–15.5)
IMM GRANULOCYTES # BLD AUTO: 0.05 K/UL — SIGNIFICANT CHANGE UP (ref 0–0.07)
IMM GRANULOCYTES NFR BLD AUTO: 0.5 % — SIGNIFICANT CHANGE UP (ref 0–0.9)
LYMPHOCYTES # BLD AUTO: 2.3 K/UL — SIGNIFICANT CHANGE UP (ref 1–3.3)
LYMPHOCYTES NFR BLD AUTO: 20.8 % — SIGNIFICANT CHANGE UP (ref 13–44)
MCHC RBC-ENTMCNC: 26.9 PG — LOW (ref 27–34)
MCHC RBC-ENTMCNC: 32.8 G/DL — SIGNIFICANT CHANGE UP (ref 32–36)
MCV RBC AUTO: 82.2 FL — SIGNIFICANT CHANGE UP (ref 80–100)
MONOCYTES # BLD AUTO: 0.88 K/UL — SIGNIFICANT CHANGE UP (ref 0–0.9)
MONOCYTES NFR BLD AUTO: 8 % — SIGNIFICANT CHANGE UP (ref 2–14)
NEUTROPHILS # BLD AUTO: 7.62 K/UL — HIGH (ref 1.8–7.4)
NEUTROPHILS NFR BLD AUTO: 68.9 % — SIGNIFICANT CHANGE UP (ref 43–77)
NRBC # BLD AUTO: 0 K/UL — SIGNIFICANT CHANGE UP (ref 0–0)
NRBC # FLD: 0 K/UL — SIGNIFICANT CHANGE UP (ref 0–0)
NRBC BLD AUTO-RTO: 0 /100 WBCS — SIGNIFICANT CHANGE UP (ref 0–0)
PLATELET # BLD AUTO: 214 K/UL — SIGNIFICANT CHANGE UP (ref 150–400)
PMV BLD: 12.2 FL — SIGNIFICANT CHANGE UP (ref 7–13)
POTASSIUM SERPL-MCNC: 4 MMOL/L — SIGNIFICANT CHANGE UP (ref 3.5–5.3)
POTASSIUM SERPL-SCNC: 4 MMOL/L — SIGNIFICANT CHANGE UP (ref 3.5–5.3)
PROT SERPL-MCNC: 9.1 G/DL — HIGH (ref 6–8.3)
RBC # BLD: 4.49 M/UL — SIGNIFICANT CHANGE UP (ref 3.8–5.2)
RBC # FLD: 12.8 % — SIGNIFICANT CHANGE UP (ref 10.3–14.5)
SODIUM SERPL-SCNC: 135 MMOL/L — SIGNIFICANT CHANGE UP (ref 135–145)
WBC # BLD: 11.05 K/UL — HIGH (ref 3.8–10.5)
WBC # FLD AUTO: 11.05 K/UL — HIGH (ref 3.8–10.5)

## 2025-06-20 PROCEDURE — 71046 X-RAY EXAM CHEST 2 VIEWS: CPT | Mod: 26

## 2025-06-20 PROCEDURE — 99284 EMERGENCY DEPT VISIT MOD MDM: CPT

## 2025-06-20 PROCEDURE — 93010 ELECTROCARDIOGRAM REPORT: CPT

## 2025-06-20 RX ORDER — AMOXICILLIN AND CLAVULANATE POTASSIUM 500; 125 MG/1; MG/1
1 TABLET, FILM COATED ORAL ONCE
Refills: 0 | Status: COMPLETED | OUTPATIENT
Start: 2025-06-20 | End: 2025-06-20

## 2025-06-20 RX ORDER — FLUTICASONE PROPIONATE 50 UG/1
1 SPRAY, METERED NASAL ONCE
Refills: 0 | Status: COMPLETED | OUTPATIENT
Start: 2025-06-20 | End: 2025-06-20

## 2025-06-20 RX ORDER — SODIUM CHLORIDE 0.65 %
1 AEROSOL, SPRAY (ML) NASAL ONCE
Refills: 0 | Status: COMPLETED | OUTPATIENT
Start: 2025-06-20 | End: 2025-06-20

## 2025-06-20 RX ORDER — AMOXICILLIN AND CLAVULANATE POTASSIUM 500; 125 MG/1; MG/1
1 TABLET, FILM COATED ORAL
Qty: 20 | Refills: 0
Start: 2025-06-20 | End: 2025-06-29

## 2025-06-20 RX ORDER — KETOROLAC TROMETHAMINE 30 MG/ML
15 INJECTION, SOLUTION INTRAMUSCULAR; INTRAVENOUS ONCE
Refills: 0 | Status: DISCONTINUED | OUTPATIENT
Start: 2025-06-20 | End: 2025-06-20

## 2025-06-20 RX ADMIN — KETOROLAC TROMETHAMINE 15 MILLIGRAM(S): 30 INJECTION, SOLUTION INTRAMUSCULAR; INTRAVENOUS at 21:20

## 2025-06-20 RX ADMIN — AMOXICILLIN AND CLAVULANATE POTASSIUM 1 TABLET(S): 500; 125 TABLET, FILM COATED ORAL at 21:19

## 2025-06-20 RX ADMIN — FLUTICASONE PROPIONATE 1 SPRAY(S): 50 SPRAY, METERED NASAL at 22:44

## 2025-06-20 RX ADMIN — Medication 1000 MILLILITER(S): at 21:20

## 2025-06-20 RX ADMIN — Medication 1 SPRAY(S): at 21:47
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
## 2025-06-20 NOTE — ED PROVIDER NOTE - CLINICAL SUMMARY MEDICAL DECISION MAKING FREE TEXT BOX
25-year-old female with no known past medical history presents to the ED complaining of right-sided frontal headache and sinus pressure, cough, and bodyaches for a week and a half.  Patient states her daughter tested positive for the HMPV virus, and shortly after she also started develop symptoms of a URI.  She states she had a really bad cough in the beginning that is slowly improving, and 2 days ago she developed pressure around her right periorbital frontal area, and nasal congestion.  She denies any vision changes, weakness, numbness, or tingling sensation, diplopia, fevers, chills, or any other associated symptoms.  She states she was seen at an urgent care and she was told to come to the ED for rule out sinus abscess.  In the ED, patient is well-appearing, in no acute distress, vital signs reviewed, heart rate 115 (appears anxious), temp 98F.  On exam, extraocular movement intact, right frontal sinuses slightly tender on percussion, neuro exam nonfocal, lungs clear.  Suspect viral URI, possibly complicated by a right sided frontal sinusitis, very low suspicion for sinus abscess at this time as there is no clinical features pointing in that direction.  Given persistent cough we will do a chest x-ray to rule out a pneumonia we will do a CBC and a CMP and symptomatic care likely will treat with Augmentin and recommend outpatient follow-up.

## 2025-06-20 NOTE — ED ADULT TRIAGE NOTE - HEART RATE (BEATS/MIN)
Continued Stay SW/CM Assessment/Plan of Care Note   DC Plan:  return to home with spouse without services or additional equipment.  Pt has access to a walker at home to use prn.  Pt has been seen independently ambulating in the hallway without a walker.  Pt will be able to arrange his own dc ride home when needed.  No dc needs are identified.      Disposition Recommendations:  SW/CM recommendation for discharge: Home    Prior To Hospitalization:    Living Situation: Spouse and residing at House   Support Systems: Spouse   Home Devices/Equipment: CPAP/BiPAP machine (T)   Mobility Assist Devices: None   Type of Service Prior to Hospitalization: None      Patient/Family discharge goal (s):  Home     Prior Function  Transfers: Independent (05/09/24 1600 : Priya Tello, RAQUEL)     Therapy Recommendations for Discharge:   PT:   Last Filed Values       None        OT:    Last Filed Values         Value Time User    OT Discharge Needs  does not require ongoing therapy 5/9/2024  4:18 PM Priya Tello, OT        Barriers to Discharge  Identified Barriers to Discharge/Transition Planning: Medical necessity for acute care              115

## 2025-06-20 NOTE — ED ADULT TRIAGE NOTE - CHIEF COMPLAINT QUOTE
Pt c/o sinus congestion and cough since Wednesday night. Pt denies fevers, chills, n/v/d. Denies past medical history.

## 2025-06-20 NOTE — ED PROVIDER NOTE - NSFOLLOWUPINSTRUCTIONS_ED_ALL_ED_FT
Please take the following medications as prescribed:  - Augmentin 1 tab twice a day for 10 days.    Follow up with your PMD within 1-2 days or you can call our clinic at [phone] for an appointment  Take all of your other medications as previously prescribed.  Worsening, continued or ANY new concerning symptoms return to the Emergency Department.    Sinus Infection, Adult  A person's face, and a person's face showing an internal view of the sinuses. Here the sinuses contain mucus.  A sinus infection, also called sinusitis, is inflammation of your sinuses. Sinuses are hollow spaces in the bones around your face. Your sinuses are located:  Around your eyes.  In the middle of your forehead.  Behind your nose.  In your cheekbones.  Mucus normally drains out of your sinuses. When your nasal tissues become inflamed or swollen, mucus can become trapped or blocked. This allows bacteria, viruses, and fungi to grow, which leads to infection. Most infections of the sinuses are caused by a virus.    A sinus infection can develop quickly. It can last for up to 4 weeks (acute) or for more than 12 weeks (chronic). A sinus infection often develops after a cold.    What are the causes?  This condition is caused by anything that creates swelling in the sinuses or stops mucus from draining. This includes:  Allergies.  Asthma.  Infection from bacteria or viruses.  Deformities or blockages in your nose or sinuses.  Abnormal growths in the nose (nasal polyps).  Pollutants, such as chemicals or irritants in the air.  Infection from fungi. This is rare.  What increases the risk?  You are more likely to develop this condition if you:  Have a weak body defense system (immune system).  Do a lot of swimming or diving.  Overuse nasal sprays.  Smoke.  What are the signs or symptoms?  The main symptoms of this condition are pain and a feeling of pressure around the affected sinuses. Other symptoms include:  Stuffy nose or congestion that makes it difficult to breathe through your nose.  Thick yellow or greenish drainage from your nose.  Tenderness, swelling, and warmth over the affected sinuses.  A cough that may get worse at night.  Decreased sense of smell and taste.  Extra mucus that collects in the throat or the back of the nose (postnasal drip) causing a sore throat or bad breath.  Tiredness (fatigue).  Fever.  How is this diagnosed?  This condition is diagnosed based on:  Your symptoms.  Your medical history.  A physical exam.  Tests to find out if your condition is acute or chronic. This may include:  Checking your nose for nasal polyps.  Viewing your sinuses using a device that has a light (endoscope).  Testing for allergies or bacteria.  Imaging tests, such as an MRI or CT scan.  In rare cases, a bone biopsy may be done to rule out more serious types of fungal sinus disease.    How is this treated?  Treatment for a sinus infection depends on the cause and whether your condition is chronic or acute.  If caused by a virus, your symptoms should go away on their own within 10 days. You may be given medicines to relieve symptoms. They include:  Medicines that shrink swollen nasal passages (decongestants).  A spray that eases inflammation of the nostrils (topical intranasal corticosteroids).  Rinses that help get rid of thick mucus in your nose (nasal saline washes).  Medicines that treat allergies (antihistamines).  Over-the-counter pain relievers.  If caused by bacteria, your health care provider may recommend waiting to see if your symptoms improve. Most bacterial infections will get better without antibiotic medicine. You may be given antibiotics if you have:  A severe infection.  A weak immune system.  If caused by narrow nasal passages or nasal polyps, surgery may be needed.  Follow these instructions at home:  Medicines    Take, use, or apply over-the-counter and prescription medicines only as told by your health care provider. These may include nasal sprays.  If you were prescribed an antibiotic medicine, take it as told by your health care provider. Do not stop taking the antibiotic even if you start to feel better.  Hydrate and humidify    A comparison of three sample cups showing dark yellow, yellow, and pale yellow urine.  Drink enough fluid to keep your urine pale yellow. Staying hydrated will help to thin your mucus.  Use a cool mist humidifier to keep the humidity level in your home above 50%.  Inhale steam for 10–15 minutes, 3–4 times a day, or as told by your health care provider. You can do this in the bathroom while a hot shower is running.  Limit your exposure to cool or dry air.  Rest    Rest as much as possible.  Sleep with your head raised (elevated).  Make sure you get enough sleep each night.  General instructions    A person washing hands with soap and water.  Apply a warm, moist washcloth to your face 3–4 times a day or as told by your health care provider. This will help with discomfort.  Use nasal saline washes as often as told by your health care provider.  Wash your hands often with soap and water to reduce your exposure to germs. If soap and water are not available, use hand .  Do not smoke. Avoid being around people who are smoking (secondhand smoke).  Keep all follow-up visits. This is important.  Contact a health care provider if:  You have a fever.  Your symptoms get worse.  Your symptoms do not improve within 10 days.  Get help right away if:  You have a severe headache.  You have persistent vomiting.  You have severe pain or swelling around your face or eyes.  You have vision problems.  You develop confusion.  Your neck is stiff.  You have trouble breathing.  These symptoms may be an emergency. Get help right away. Call 911.  Do not wait to see if the symptoms will go away.  Do not drive yourself to the hospital.  Summary  A sinus infection is soreness and inflammation of your sinuses. Sinuses are hollow spaces in the bones around your face.  This condition is caused by nasal tissues that become inflamed or swollen. The swelling traps or blocks the flow of mucus. This allows bacteria, viruses, and fungi to grow, which leads to infection.  If you were prescribed an antibiotic medicine, take it as told by your health care provider. Do not stop taking the antibiotic even if you start to feel better.  Keep all follow-up visits. This is important.

## 2025-06-20 NOTE — ED PROVIDER NOTE - PATIENT PORTAL LINK FT
You can access the FollowMyHealth Patient Portal offered by [facility] by registering at the following website: [email or web address]. By joining [employer]’s FollowMyHealth portal, you will also be able to view your health information using other applications (apps) compatible with our system.

## 2025-06-24 PROBLEM — Z78.9 OTHER SPECIFIED HEALTH STATUS: Chronic | Status: ACTIVE | Noted: 2025-06-20

## 2025-07-11 ENCOUNTER — APPOINTMENT (OUTPATIENT)
Dept: INTERNAL MEDICINE | Facility: CLINIC | Age: 25
End: 2025-07-11